# Patient Record
Sex: FEMALE | Race: WHITE | Employment: OTHER | URBAN - METROPOLITAN AREA
[De-identification: names, ages, dates, MRNs, and addresses within clinical notes are randomized per-mention and may not be internally consistent; named-entity substitution may affect disease eponyms.]

---

## 2017-02-20 DIAGNOSIS — F34.1 DYSTHYMIA: ICD-10-CM

## 2017-02-21 RX ORDER — FLUOXETINE 10 MG/1
CAPSULE ORAL
Qty: 30 CAP | Refills: 4 | Status: SHIPPED | OUTPATIENT
Start: 2017-02-21 | End: 2018-01-03 | Stop reason: SDUPTHER

## 2017-02-21 RX ORDER — ALENDRONATE SODIUM 70 MG/1
TABLET ORAL
Qty: 12 TAB | Refills: 1 | Status: SHIPPED | OUTPATIENT
Start: 2017-02-21 | End: 2017-10-02 | Stop reason: SDUPTHER

## 2017-10-03 RX ORDER — ALENDRONATE SODIUM 70 MG/1
TABLET ORAL
Qty: 12 TAB | Refills: 0 | Status: SHIPPED | OUTPATIENT
Start: 2017-10-03 | End: 2018-01-03 | Stop reason: SDUPTHER

## 2017-10-03 RX ORDER — ALENDRONATE SODIUM 70 MG/1
TABLET ORAL
Qty: 12 TAB | Refills: 0 | Status: SHIPPED | OUTPATIENT
Start: 2017-10-03 | End: 2018-04-18 | Stop reason: ALTCHOICE

## 2018-01-03 ENCOUNTER — OFFICE VISIT (OUTPATIENT)
Dept: INTERNAL MEDICINE CLINIC | Age: 68
End: 2018-01-03

## 2018-01-03 VITALS
DIASTOLIC BLOOD PRESSURE: 59 MMHG | OXYGEN SATURATION: 97 % | WEIGHT: 176 LBS | HEIGHT: 65 IN | RESPIRATION RATE: 16 BRPM | HEART RATE: 58 BPM | BODY MASS INDEX: 29.32 KG/M2 | TEMPERATURE: 97.5 F | SYSTOLIC BLOOD PRESSURE: 114 MMHG

## 2018-01-03 DIAGNOSIS — Z12.39 BREAST CANCER SCREENING: ICD-10-CM

## 2018-01-03 DIAGNOSIS — M81.6 LOCALIZED OSTEOPOROSIS WITHOUT CURRENT PATHOLOGICAL FRACTURE: ICD-10-CM

## 2018-01-03 DIAGNOSIS — F34.1 DYSTHYMIA: ICD-10-CM

## 2018-01-03 DIAGNOSIS — M47.816 SPONDYLOSIS OF LUMBAR REGION WITHOUT MYELOPATHY OR RADICULOPATHY: Primary | ICD-10-CM

## 2018-01-03 RX ORDER — FLUOXETINE 10 MG/1
CAPSULE ORAL
Qty: 30 CAP | Refills: 5 | Status: SHIPPED | OUTPATIENT
Start: 2018-01-03 | End: 2018-12-05 | Stop reason: SDUPTHER

## 2018-01-03 RX ORDER — SULINDAC 200 MG/1
200 TABLET ORAL
Qty: 30 TAB | Refills: 2 | Status: SHIPPED | OUTPATIENT
Start: 2018-01-03 | End: 2018-04-18 | Stop reason: SDUPTHER

## 2018-01-03 NOTE — MR AVS SNAPSHOT
Visit Information Date & Time Provider Department Dept. Phone Encounter #  
 1/3/2018  3:00 PM Madelon Cabot, MD [facility] [address] [id number] Upcoming Health Maintenance Date Due Hepatitis C Screening 1950 GLAUCOMA SCREENING Q2Y 8/25/2015 Pneumococcal 65+ Low/Medium Risk (2 of 2 - PCV13) 12/21/2016 BREAST CANCER SCRN MAMMOGRAM 2/23/2017 MEDICARE YEARLY EXAM 4/12/2017 Influenza Age 5 to Adult 8/1/2017 DTaP/Tdap/Td series (2 - Td) 9/8/2019 COLONOSCOPY 11/29/2021 Allergies as of 1/3/2018  Review Complete On: 1/3/2018 By: Madelon Cabot, MD  
 No Known Allergies Current Immunizations  Reviewed on 1/3/2018 Name Date Pneumococcal Polysaccharide (PPSV-23) 12/21/2015 TDAP Vaccine 9/8/2009  3:50 PM  
 Zoster Vaccine, Live 8/5/2013 Reviewed by Madelon Cabot, MD on 1/3/2018 at  3:51 PM  
You Were Diagnosed With   
  
 Codes Comments Spondylosis of lumbar region without myelopathy or radiculopathy    -  Primary ICD-10-CM: M47.816 ICD-9-CM: 721.3 Dysthymia     ICD-10-CM: F34.1 ICD-9-CM: 300.4 Localized osteoporosis without current pathological fracture     ICD-10-CM: M81.6 ICD-9-CM: 733.09 Breast cancer screening     ICD-10-CM: Z12.31 
ICD-9-CM: V76.10 Vitals BP Pulse Temp Resp Height(growth percentile) Weight(growth percentile) 114/59 (BP 1 Location: Left arm, BP Patient Position: Sitting) (!) 58 97.5 °F (36.4 °C) (Oral) 16 5' 5\" (1.651 m) 176 lb (79.8 kg) LMP SpO2 BMI OB Status Smoking Status 04/23/2003 97% 29.29 kg/m2 Postmenopausal Never Smoker Vitals History BMI and BSA Data Body Mass Index Body Surface Area  
 29.29 kg/m 2 1.91 m 2 Preferred Pharmacy Pharmacy Name Phone [address] [place], [place] - [phone] [place] [phone] Your Updated Medication List  
  
   
 This list is accurate as of: 1/3/18  3:53 PM.  Always use your most recent med list.  
  
  
  
  
 alendronate 70 mg tablet Commonly known as:  FOSAMAX  
take 1 tablet by mouth every Sunday for 90 days FLUoxetine 10 mg capsule Commonly known as:  PROzac Take 1 cap by mouth every fourty-eight (48) hours for 90 doses. sulindac 200 mg tablet Commonly known as:  CLINORIL Take 1 Tab by mouth daily as needed. appt needed for further refills VITAMIN D3 1,000 unit Cap Generic drug:  cholecalciferol Take 2,000 Units by mouth. Prescriptions Sent to Pharmacy Refills  
 sulindac (CLINORIL) 200 mg tablet 2 Sig: Take 1 Tab by mouth daily as needed. appt needed for further refills Class: Normal  
 Pharmacy: [address] [place] [address] Ph #: [phone] Route: Oral  
 FLUoxetine (PROZAC) 10 mg capsule 5 Sig: Take 1 cap by mouth every fourty-eight (48) hours for 90 doses. Class: Normal  
 Pharmacy: [address] [place] [address] Ph #: [phone] To-Do List   
 01/03/2018 Imaging:  DEXA BONE DENSITY STUDY AXIAL   
  
 01/03/2018 Imaging:  GEOVANY 3D JENNIFER W MAMMO BI SCREENING INCL CAD Referral Information Referral ID Referred By Referred To  
  
 [id number] Mercedes GUERRIER Not Available Visits Status Start Date End Date 1 New Request 1/3/18 1/3/19 If your referral has a status of pending review or denied, additional information will be sent to support the outcome of this decision. Introducing [facility] & HEALTH SERVICES! Dear Amelia Ruiz: 
Thank you for requesting a Overblog account. Our records indicate that you already have an active Overblog account. You can access your account anytime at [email or web address]. [email or web address] Technology Did you know that you can access your hospital and ER discharge instructions at any time in Overblog?   You can also review all of your test results from your hospital stay or ER visit. Additional Information If you have questions, please visit the Frequently Asked Questions section of the EventSorbet website at [email or web address] Remember, EventSorbet is NOT to be used for urgent needs. For medical emergencies, dial 911. Now available from your iPhone and Android! Please provide this summary of care documentation to your next provider. Your primary care clinician is listed as Charla Whitley. If you have any questions after today's visit, please call [phone].

## 2018-01-12 ENCOUNTER — HOSPITAL ENCOUNTER (OUTPATIENT)
Dept: MAMMOGRAPHY | Age: 68
Discharge: HOME | End: 2018-01-12
Attending: INTERNAL MEDICINE
Payer: MEDICARE

## 2018-01-12 DIAGNOSIS — M81.6 LOCALIZED OSTEOPOROSIS WITHOUT CURRENT PATHOLOGICAL FRACTURE: ICD-10-CM

## 2018-01-12 DIAGNOSIS — Z12.39 BREAST CANCER SCREENING: ICD-10-CM

## 2018-01-12 PROCEDURE — 77063 BREAST TOMOSYNTHESIS BI: CPT

## 2018-01-12 PROCEDURE — 77080 DXA BONE DENSITY AXIAL: CPT

## 2018-01-15 ENCOUNTER — PATIENT MESSAGE (OUTPATIENT)
Dept: INTERNAL MEDICINE CLINIC | Age: 68
End: 2018-01-15

## 2018-01-15 ENCOUNTER — TELEPHONE (OUTPATIENT)
Dept: INTERNAL MEDICINE CLINIC | Age: 68
End: 2018-01-15

## 2018-01-15 NOTE — TELEPHONE ENCOUNTER
----- Message from Rowan Newman sent at 1/15/2018  4:10 PM EST -----  Regarding: Dr. Delisa Rosario  Pt is requesting a call, in regards to a CPE. Pt is stating that 9:00 AM is too late for her and she'd like to be seen earlier for blood work. Best contact number [phone].

## 2018-01-16 NOTE — TELEPHONE ENCOUNTER
Scheduled patient for April 18th at 7:30 for her medicare wellness. Patient aware & voiced understanding.

## 2018-01-17 NOTE — TELEPHONE ENCOUNTER
From: Negrito Richards MD  To: Calin Hair  Sent: 1/15/2018 3:38 PM EST  Subject: dexa    Shows stable to Improved osteopenia-keep up the good work!   Odlays Padron

## 2018-04-18 ENCOUNTER — OFFICE VISIT (OUTPATIENT)
Dept: INTERNAL MEDICINE CLINIC | Age: 68
End: 2018-04-18

## 2018-04-18 ENCOUNTER — HOSPITAL ENCOUNTER (OUTPATIENT)
Dept: LAB | Age: 68
Discharge: HOME | End: 2018-04-18
Payer: MEDICARE

## 2018-04-18 VITALS
OXYGEN SATURATION: 99 % | TEMPERATURE: 98 F | HEART RATE: 61 BPM | BODY MASS INDEX: 27.99 KG/M2 | WEIGHT: 168 LBS | HEIGHT: 65 IN | DIASTOLIC BLOOD PRESSURE: 54 MMHG | SYSTOLIC BLOOD PRESSURE: 107 MMHG | RESPIRATION RATE: 15 BRPM

## 2018-04-18 DIAGNOSIS — F34.1 DYSTHYMIA: ICD-10-CM

## 2018-04-18 DIAGNOSIS — K51.90 ULCERATIVE COLITIS WITHOUT COMPLICATIONS, UNSPECIFIED LOCATION (HCC): ICD-10-CM

## 2018-04-18 DIAGNOSIS — M47.896 OTHER OSTEOARTHRITIS OF SPINE, LUMBAR REGION: ICD-10-CM

## 2018-04-18 DIAGNOSIS — M47.816 SPONDYLOSIS OF LUMBAR REGION WITHOUT MYELOPATHY OR RADICULOPATHY: ICD-10-CM

## 2018-04-18 DIAGNOSIS — Z00.00 MEDICARE ANNUAL WELLNESS VISIT, SUBSEQUENT: Primary | ICD-10-CM

## 2018-04-18 PROCEDURE — 81001 URINALYSIS AUTO W/SCOPE: CPT

## 2018-04-18 PROCEDURE — 84443 ASSAY THYROID STIM HORMONE: CPT

## 2018-04-18 PROCEDURE — 85025 COMPLETE CBC W/AUTO DIFF WBC: CPT

## 2018-04-18 PROCEDURE — 82306 VITAMIN D 25 HYDROXY: CPT

## 2018-04-18 PROCEDURE — 80053 COMPREHEN METABOLIC PANEL: CPT

## 2018-04-18 PROCEDURE — 80061 LIPID PANEL: CPT

## 2018-04-18 PROCEDURE — 36415 COLL VENOUS BLD VENIPUNCTURE: CPT

## 2018-04-18 RX ORDER — 1.1% SODIUM FLUORIDE PRESCRIPTION DENTAL CREAM 5 MG/G
CREAM DENTAL AS DIRECTED
COMMUNITY
Start: 2018-03-23

## 2018-04-18 RX ORDER — METRONIDAZOLE 7.5 MG/G
CREAM TOPICAL AS DIRECTED
COMMUNITY
Start: 2018-03-22

## 2018-04-18 RX ORDER — SULINDAC 200 MG/1
200 TABLET ORAL
Qty: 30 TAB | Refills: 5 | Status: SHIPPED | OUTPATIENT
Start: 2018-04-18 | End: 2019-01-22 | Stop reason: SDUPTHER

## 2018-04-18 NOTE — PATIENT INSTRUCTIONS

## 2018-04-18 NOTE — MR AVS SNAPSHOT
[facility] 
 
 
 [address] [place] [address] [address] 
[phone] Patient: Linda Agiular MRN: [id number] FWC:[id number] Visit Information Date & Time Provider Department Dept. Phone Encounter #  
 4/18/2018  7:30 AM Karl Caballero MD [facility] [address]  [id number] Upcoming Health Maintenance Date Due Hepatitis C Screening 1950 GLAUCOMA SCREENING Q2Y 8/25/2015 Pneumococcal 65+ Low/Medium Risk (2 of 2 - PCV13) 12/21/2016 MEDICARE YEARLY EXAM 3/14/2018 DTaP/Tdap/Td series (2 - Td) 9/8/2019 BREAST CANCER SCRN MAMMOGRAM 1/12/2020 COLONOSCOPY 11/29/2021 Allergies as of 4/18/2018  Review Complete On: 4/18/2018 By: Karl Caballero MD  
 No Known Allergies Current Immunizations  Reviewed on 4/18/2018 Name Date Influenza High Dose Vaccine PF 1/3/2018 Pneumococcal Polysaccharide (PPSV-23) 12/21/2015 TDAP Vaccine 9/8/2009  3:50 PM  
 Zoster Vaccine, Live 8/5/2013 Reviewed by Karl Caballero MD on 4/18/2018 at  7:37 AM  
You Were Diagnosed With   
  
 Codes Comments Medicare annual wellness visit, subsequent    -  Primary ICD-10-CM: Z00.00 ICD-9-CM: V70.0 Other osteoarthritis of spine, lumbar region     ICD-10-CM: M47.896 ICD-9-CM: 721.3 Dysthymia     ICD-10-CM: F34.1 ICD-9-CM: 300.4 Ulcerative colitis without complications, unspecified location [facility])     ICD-10-CM: K51.90 ICD-9-CM: 039. 9 Vitals BP Pulse Temp Resp Height(growth percentile) Weight(growth percentile) 107/54 (BP 1 Location: Left arm, BP Patient Position: Sitting) 61 98 °F (36.7 °C) (Oral) 15 5' 5\" (1.651 m) 168 lb (76.2 kg) LMP SpO2 BMI OB Status Smoking Status 04/23/2003 99% 27.96 kg/m2 Postmenopausal Never Smoker Vitals History BMI and BSA Data Body Mass Index Body Surface Area  
 27.96 kg/m 2 1.87 m 2 Preferred Pharmacy Pharmacy Name Phone [facility] [address] [facility] [address] [facility] [phone] [facility] [phone] Your Updated Medication List  
  
   
This list is accurate as of 4/18/18  7:54 AM.  Always use your most recent med list.  
  
  
  
  
 FLUoxetine 10 mg capsule Commonly known as:  PROzac Take 1 cap by mouth every fourty-eight (48) hours for 90 doses. metroNIDAZOLE 0.75 % topical cream  
Commonly known as:  METROCREAM  
as directed. SF 5000 PLUS 1.1 % Crea Generic drug:  fluoride (sodium)  
as directed. sulindac 200 mg tablet Commonly known as:  CLINORIL Take 1 Tab by mouth daily as needed. appt needed for further refills VITAMIN D3 1,000 unit Cap Generic drug:  cholecalciferol Take 2,000 Units by mouth. We Performed the Following CBC WITH AUTOMATED DIFF [30206 CPT(R)] LIPID PANEL [99027 CPT(R)] METABOLIC PANEL, COMPREHENSIVE [87033 CPT(R)] REFERRAL TO GASTROENTEROLOGY [HXP66 Custom] TSH 3RD GENERATION [20531 CPT(R)] URINALYSIS W/ RFLX MICROSCOPIC [34735 CPT(R)] VITAMIN D, 25 HYDROXY [id number] CPT(R)] Referral Information Referral ID Referred By Referred To  
  
 [id number] [facility] [address] [place] [address] Phone: [phone] Fax: [phone] Visits Status Start Date End Date 1 New Request 4/18/18 4/18/19 If your referral has a status of pending review or denied, additional information will be sent to support the outcome of this decision. Introducing [facility] & HEALTH SERVICES! Dear Kenn Gabriel: 
Thank you for requesting a Payvment account. Our records indicate that you already have an active Payvment account. You can access your account anytime at [email or web address] Water/Cooper's Classics Did you know that you can access your hospital and ER discharge instructions at any time in Payvment?   You can also review all of your test results from your hospital stay or ER visit. Additional Information If you have questions, please visit the Frequently Asked Questions section of the Featherlight website at [email or web address] Remember, Featherlight is NOT to be used for urgent needs. For medical emergencies, dial 911. Now available from your iPhone and Android! Please provide this summary of care documentation to your next provider. Your primary care clinician is listed as Charla Whitley. If you have any questions after today's visit, please call [phone].

## 2018-04-18 NOTE — PROGRESS NOTES
This is the Subsequent Medicare Annual Wellness Exam, performed 12 months or more after the Initial AWV or the last Subsequent AWV    I have reviewed the patient's medical history in detail and updated the computerized patient record. History     Past Medical History:   Diagnosis Date    DJD (degenerative joint disease), lumbar 6/9/2009    Dysthymia 6/9/2009    Ulcerative colitis ([facility] [address] 6/9/2009      Past Surgical History:   Procedure Laterality Date    ENDOSCOPY, COLON, DIAGNOSTIC  11/29/11    dr Andre Devlin normal     Current Outpatient Prescriptions   Medication Sig Dispense Refill    metroNIDAZOLE (METROCREAM) 0.75 % topical cream as directed.  SF 5000 PLUS 1.1 % crea as directed.  sulindac (CLINORIL) 200 mg tablet Take 1 Tab by mouth daily as needed. appt needed for further refills 30 Tab 2    FLUoxetine (PROZAC) 10 mg capsule Take 1 cap by mouth every fourty-eight (48) hours for 90 doses. 30 Cap 5    Cholecalciferol, Vitamin D3, (VITAMIN D3) 1,000 unit cap Take 2,000 Units by mouth. No Known Allergies  Family History   Problem Relation Age of Onset    Hypertension Sister      Social History   Substance Use Topics    Smoking status: Never Smoker    Smokeless tobacco: Never Used    Alcohol use 3.0 oz/week     6 Glasses of wine per week     Patient Active Problem List   Diagnosis Code    DJD (degenerative joint disease), lumbar M47.816    Dysthymia F34.1    Ulcerative colitis ([facility] [address] K51.90    Menopause Z78.0       Depression Risk Factor Screening:     PHQ over the last two weeks 4/18/2018   Little interest or pleasure in doing things Not at all   Feeling down, depressed or hopeless Not at all   Total Score PHQ 2 0     Alcohol Risk Factor Screening: You do not drink alcohol or very rarely. Functional Ability and Level of Safety:   Hearing Loss  The patient wears hearing aids. Activities of Daily Living  The home contains: no safety equipment.   Patient does total self care    Fall Risk  Fall Risk Assessment, last 12 mths 4/18/2018   Able to walk? Yes   Fall in past 12 months? No   Fall with injury? No       Abuse Screen  Patient is not abused    Cognitive Screening   Evaluation of Cognitive Function:  Has your family/caregiver stated any concerns about your memory: no  Normal    Patient Care Team   Patient Care Team:  Hubert Peres MD as PCP - General    Assessment/Plan   Education and counseling provided:  Are appropriate based on today's review and evaluation  Pneumococcal Vaccine  Influenza Vaccine  Screening Mammography  Colorectal cancer screening tests  Bone mass measurement (DEXA)  Screening for glaucoma    Diagnoses and all orders for this visit:    1. Medicare annual wellness visit, subsequent  -     URINALYSIS W/ RFLX MICROSCOPIC    2. Other osteoarthritis of spine, lumbar region-gardening and uses prn nsaid-     LIPID PANEL  -     METABOLIC PANEL, COMPREHENSIVE    3. Dysthymia-cont prozac 10 qod  -     TSH 3RD GENERATION    4.  Ulcerative colitis without complications, unspecified location (HCC)-no sxs now but last study done in 2011 advised repeat  -     Sharon Lunch [facility]  -     CBC WITH AUTOMATED DIFF  -     VITAMIN D, [address] Maintenance Due   Topic Date Due    Hepatitis C Screening  1950    GLAUCOMA SCREENING Q2Y  08/25/2015    Pneumococcal 65+ Low/Medium Risk (2 of 2 - PCV13) 12/21/2016    MEDICARE YEARLY EXAM  03/14/2018

## 2018-04-18 NOTE — PROGRESS NOTES
HISTORY OF PRESENT ILLNESS  Nasreen Landa is a 79 y.o. female. HPI  Doing well. Issues:  1. Arthritis. Uses Clinoril prn after she's been gardening. She's had no GI upset with it or evidence of bleeding. 2. Dysthymia, managed with very low dose Fluoxetine 10 mg every other day. She is working on a garden and feels optimistic and happy. 3. Bones. Bone density in January showed osteopenia. She's had 3-4 years of Fosamax. Will take a drug holiday, stay off Fosamax, continue vitamin D, weightbearing exercise and recheck study in two years. Review of Systems   Constitutional: Negative for chills, fever and weight loss. Respiratory: Negative for cough, shortness of breath and wheezing. Cardiovascular: Negative for chest pain, palpitations, orthopnea, leg swelling and PND. Gastrointestinal: Negative for abdominal pain, diarrhea, heartburn and nausea. Musculoskeletal: Positive for joint pain. Negative for myalgias. Neurological: Negative for dizziness and headaches. Physical Exam   Constitutional: She is oriented to person, place, and time. She appears well-developed and well-nourished. HENT:   Head: Normocephalic and atraumatic. Eyes: Pupils are equal, round, and reactive to light. Neck: Normal range of motion. Neck supple. Carotid bruit is not present. No thyromegaly present. Cardiovascular: Normal rate, regular rhythm, S1 normal, S2 normal, normal heart sounds and intact distal pulses. No murmur heard. Pulmonary/Chest: Effort normal and breath sounds normal. No respiratory distress. She has no wheezes. She has no rales. Breast exam bilaterally without masses axillary nodes or discharge. BSE reviewed      Abdominal: Soft. Bowel sounds are normal. She exhibits no distension and no mass. There is no tenderness. Musculoskeletal: She exhibits no edema. Neurological: She is alert and oriented to person, place, and time. Skin: No rash noted.    Psychiatric: She has a normal mood and affect. Her behavior is normal.   Nursing note and vitals reviewed. ASSESSMENT and PLAN  Diagnoses and all orders for this visit:    1. Medicare annual wellness visit, subsequent  -     URINALYSIS W/ RFLX MICROSCOPIC    2. Other osteoarthritis of spine, lumbar region  -     LIPID PANEL  -     METABOLIC PANEL, COMPREHENSIVE    3. Dysthymia  -     TSH 3RD GENERATION    4. Ulcer[facility] without complications[facility]  -     Madhuri Kirkland [facility]  -     CBC WITH AUTOMATED DIFF  -     VITAMIN D, 25 HYDROXY    5. Spondylosis of lumbar region without myelopathy or radiculopathy  -     sulindac (CLINORIL) 200 mg tablet; Take 1 Tab by mouth daily as needed.

## 2018-04-19 LAB
25(OH)D3+25(OH)D2 SERPL-MCNC: 42.6 NG/ML (ref 30–100)
ALBUMIN SERPL-MCNC: 4 G/DL (ref 3.6–4.8)
ALBUMIN/GLOB SERPL: 1.5 {RATIO} (ref 1.2–2.2)
ALP SERPL-CCNC: 54 IU/L (ref 39–117)
ALT SERPL-CCNC: 9 IU/L (ref 0–32)
APPEARANCE UR: CLEAR
AST SERPL-CCNC: 16 IU/L (ref 0–40)
BACTERIA #/AREA URNS HPF: ABNORMAL /[HPF]
BASOPHILS # BLD AUTO: 0 X10E3/UL (ref 0–0.2)
BASOPHILS NFR BLD AUTO: 0 %
BILIRUB SERPL-MCNC: 0.3 MG/DL (ref 0–1.2)
BILIRUB UR QL STRIP: NEGATIVE
BUN SERPL-MCNC: 12 MG/DL (ref 8–27)
BUN/CREAT SERPL: 18 (ref 12–28)
CALCIUM SERPL-MCNC: 9.4 MG/DL (ref 8.7–10.3)
CASTS URNS QL MICRO: ABNORMAL /LPF
CHLORIDE SERPL-SCNC: 100 MMOL/L (ref 96–106)
CHOLEST SERPL-MCNC: 171 MG/DL (ref 100–199)
CO2 SERPL-SCNC: 27 MMOL/L (ref 18–29)
COLOR UR: YELLOW
CREAT SERPL-MCNC: 0.66 MG/DL (ref 0.57–1)
EOSINOPHIL # BLD AUTO: 0.1 X10E3/UL (ref 0–0.4)
EOSINOPHIL NFR BLD AUTO: 2 %
EPI CELLS #/AREA URNS HPF: ABNORMAL /HPF
ERYTHROCYTE [DISTWIDTH] IN BLOOD BY AUTOMATED COUNT: 13.3 % (ref 12.3–15.4)
GFR SERPLBLD CREATININE-BSD FMLA CKD-EPI: 106 ML/MIN/1.73
GFR SERPLBLD CREATININE-BSD FMLA CKD-EPI: 92 ML/MIN/1.73
GLOBULIN SER CALC-MCNC: 2.6 G/DL (ref 1.5–4.5)
GLUCOSE SERPL-MCNC: 92 MG/DL (ref 65–99)
GLUCOSE UR QL: NEGATIVE
HCT VFR BLD AUTO: 39.5 % (ref 34–46.6)
HDLC SERPL-MCNC: 56 MG/DL
HGB BLD-MCNC: 13.2 G/DL (ref 11.1–15.9)
HGB UR QL STRIP: NEGATIVE
IMM GRANULOCYTES # BLD: 0 X10E3/UL (ref 0–0.1)
IMM GRANULOCYTES NFR BLD: 0 %
INTERPRETATION, 910389: NORMAL
KETONES UR QL STRIP: NEGATIVE
LDLC SERPL CALC-MCNC: 106 MG/DL (ref 0–99)
LEUKOCYTE ESTERASE UR QL STRIP: ABNORMAL
LYMPHOCYTES # BLD AUTO: 1.1 X10E3/UL (ref 0.7–3.1)
LYMPHOCYTES NFR BLD AUTO: 23 %
MCH RBC QN AUTO: 30.9 PG (ref 26.6–33)
MCHC RBC AUTO-ENTMCNC: 33.4 G/DL (ref 31.5–35.7)
MCV RBC AUTO: 93 FL (ref 79–97)
MICRO URNS: ABNORMAL
MONOCYTES # BLD AUTO: 0.5 X10E3/UL (ref 0.1–0.9)
MONOCYTES NFR BLD AUTO: 10 %
NEUTROPHILS # BLD AUTO: 3.2 X10E3/UL (ref 1.4–7)
NEUTROPHILS NFR BLD AUTO: 65 %
NITRITE UR QL STRIP: NEGATIVE
PH UR STRIP: 8.5 [PH] (ref 5–7.5)
PLATELET # BLD AUTO: 311 X10E3/UL (ref 150–379)
POTASSIUM SERPL-SCNC: 4.8 MMOL/L (ref 3.5–5.2)
PROT SERPL-MCNC: 6.6 G/DL (ref 6–8.5)
PROT UR QL STRIP: ABNORMAL
RBC # BLD AUTO: 4.27 X10E6/UL (ref 3.77–5.28)
RBC #/AREA URNS HPF: ABNORMAL /HPF
SODIUM SERPL-SCNC: 141 MMOL/L (ref 134–144)
SP GR UR: 1.02 (ref 1–1.03)
TRIGL SERPL-MCNC: 44 MG/DL (ref 0–149)
TSH SERPL DL<=0.005 MIU/L-ACNC: 2.27 UIU/ML (ref 0.45–4.5)
UROBILINOGEN UR STRIP-MCNC: 0.2 MG/DL (ref 0.2–1)
VLDLC SERPL CALC-MCNC: 9 MG/DL (ref 5–40)
WBC # BLD AUTO: 5 X10E3/UL (ref 3.4–10.8)
WBC #/AREA URNS HPF: ABNORMAL /HPF

## 2018-08-05 NOTE — H&P
Gastroenterology Outpatient History and Physical    Patient: Mony Browne    Physician: Ileana Mina MD    Vital Signs:  See nursing notes    Allergies: No Known Allergies    Chief Complaint:  Personal history of colon polyps    History of Present Illness:  2011 tubular had normal removed from colon. No symptoms; no chest pain, SOB, edema, focal weakness, numbness    Justification for Procedure:   age    History:  Past Medical History:   Diagnosis Date    DJD (degenerative joint disease), lumbar 6/9/2009    Dysthymia 6/9/2009    Ulcerative colitis ([address] 6/9/2009      Past Surgical History:   Procedure Laterality Date    ENDOSCOPY, COLON, DIAGNOSTIC  11/29/11    dr Jose Rutledge normal      Social History     Social History    Marital status:      Spouse name: N/A    Number of children: N/A    Years of education: N/A     Social History Main Topics    Smoking status: Never Smoker    Smokeless tobacco: Never Used    Alcohol use 3.0 oz/week     6 Glasses of wine per week    Drug use: No    Sexual activity: Not Currently     Other Topics Concern    Not on file     Social History Narrative      Family History   Problem Relation Age of Onset    Hypertension Sister        Medications:   Prior to Admission medications    Medication Sig Start Date End Date Taking? Authorizing Provider   metroNIDAZOLE (METROCREAM) 0.75 % topical cream as directed. 3/22/18   Historical Provider   SF 5000 PLUS 1.1 % crea as directed. 3/23/18   Historical Provider   sulindac (CLINORIL) 200 mg tablet Take 1 Tab by mouth daily as needed. 4/18/18   Chaim Bland MD   FLUoxetine (PROZAC) 10 mg capsule Take 1 cap by mouth every fourty-eight (48) hours for 90 doses. 1/3/18   Chaim Bland MD   Cholecalciferol, Vitamin D3, (VITAMIN D3) 1,000 unit cap Take 2,000 Units by mouth. Historical Provider       Physical Exam:   General: alert, cooperative, no distress   HEENT: Head: Normal, normocephalic, atraumatic.    Heart: regular rate and rhythm   Lungs: chest clear, no wheezing, rales, normal symmetric air entry   Abdominal: Bowel sounds are normal, liver is not enlarged, spleen is not enlarged           Findings/Diagnosis:  Personal history of colon polyps    Plan of Care/Planned Procedure: I've discussed colonoscopy possible biopsy, polypectomy, cautery, injection, alternatives, complications including but not limited to pain, cardiopulmonary event, bleeding, perforation requiring additional blood transfusion or operative repair; all questions answered.     Signed By: Jakob Root MD     August 13, 2018

## 2018-08-08 NOTE — PERIOP NOTES
[address]  Endoscopy Preprocedure Instructions      1. On the day of your surgery, please report to registration located on the 2nd floor of the  [facility]. yes    2. You must have a responsible adult to drive you to the hospital, stay at the hospital during your procedure and drive you home. If they leave your procedure will not be started (no exceptions). yes    3. Do not have anything to eat or drink (including water, gum, mints, coffee, and juice) after midnight. This does not apply to the medications you were instructed to take by your physician. yesIf you are currently taking Plavix, Coumadin, Aspirin, or other blood-thinning agents, contact your physician for special instructions. not applicable,    4. If you are having a procedure that requires bowel prep: We recommend that you have only clear liquids the day before your procedure and begin your bowel prep by 5:00 pm.  You may continue to drink clear liquids until midnight. If for any reason you are not able to complete your prep please notify your physician immediately. yes    5. Have a list of all current medications, including vitamins, herbal supplements and any other over the counter medications. yes    6. If you wear glasses, contacts, dentures and/or hearing aids, they may be removed prior to procedure, please bring a case to store them in. yes    7. You should understand that if you do not follow these instructions your procedure may be cancelled. If your physical condition changes (I.e. fever, cold or flu) please contact your doctor as soon as possible. 8. It is important that you be on time.   If for any reason you are unable to keep your appointment please call [phone] the day of or your physicians office prior to your scheduled procedure

## 2018-08-13 ENCOUNTER — ANESTHESIA EVENT (OUTPATIENT)
Dept: ENDOSCOPY | Age: 68
End: 2018-08-13
Payer: MEDICARE

## 2018-08-13 ENCOUNTER — HOSPITAL ENCOUNTER (OUTPATIENT)
Age: 68
Discharge: HOME | End: 2018-08-13
Attending: INTERNAL MEDICINE | Admitting: INTERNAL MEDICINE
Payer: MEDICARE

## 2018-08-13 ENCOUNTER — ANESTHESIA (OUTPATIENT)
Dept: ENDOSCOPY | Age: 68
End: 2018-08-13
Payer: MEDICARE

## 2018-08-13 VITALS
OXYGEN SATURATION: 98 % | RESPIRATION RATE: 13 BRPM | SYSTOLIC BLOOD PRESSURE: 118 MMHG | HEART RATE: 60 BPM | BODY MASS INDEX: 27.32 KG/M2 | DIASTOLIC BLOOD PRESSURE: 59 MMHG | WEIGHT: 164 LBS | TEMPERATURE: 97.9 F | HEIGHT: 65 IN

## 2018-08-13 PROCEDURE — 77030013992 HC SNR POLYP ENDOSC BSC -B: Performed by: INTERNAL MEDICINE

## 2018-08-13 PROCEDURE — 74011250636 HC RX REV CODE- 250/636

## 2018-08-13 PROCEDURE — 76040000019: Performed by: INTERNAL MEDICINE

## 2018-08-13 PROCEDURE — 88305 TISSUE EXAM BY PATHOLOGIST: CPT | Performed by: INTERNAL MEDICINE

## 2018-08-13 PROCEDURE — 76060000031 HC ANESTHESIA FIRST 0.5 HR: Performed by: INTERNAL MEDICINE

## 2018-08-13 RX ORDER — EPINEPHRINE 0.1 MG/ML
1 INJECTION INTRACARDIAC; INTRAVENOUS
Status: DISCONTINUED | OUTPATIENT
Start: 2018-08-13 | End: 2018-08-13 | Stop reason: HOSPADM

## 2018-08-13 RX ORDER — PROPOFOL 10 MG/ML
INJECTION, EMULSION INTRAVENOUS
Status: DISCONTINUED | OUTPATIENT
Start: 2018-08-13 | End: 2018-08-13 | Stop reason: HOSPADM

## 2018-08-13 RX ORDER — FLUMAZENIL 0.1 MG/ML
0.2 INJECTION INTRAVENOUS
Status: DISCONTINUED | OUTPATIENT
Start: 2018-08-13 | End: 2018-08-13 | Stop reason: HOSPADM

## 2018-08-13 RX ORDER — PROPOFOL 10 MG/ML
INJECTION, EMULSION INTRAVENOUS AS NEEDED
Status: DISCONTINUED | OUTPATIENT
Start: 2018-08-13 | End: 2018-08-13 | Stop reason: HOSPADM

## 2018-08-13 RX ORDER — NALOXONE HYDROCHLORIDE 0.4 MG/ML
0.4 INJECTION, SOLUTION INTRAMUSCULAR; INTRAVENOUS; SUBCUTANEOUS
Status: DISCONTINUED | OUTPATIENT
Start: 2018-08-13 | End: 2018-08-13 | Stop reason: HOSPADM

## 2018-08-13 RX ORDER — DEXTROMETHORPHAN/PSEUDOEPHED 2.5-7.5/.8
1.2 DROPS ORAL
Status: DISCONTINUED | OUTPATIENT
Start: 2018-08-13 | End: 2018-08-13 | Stop reason: HOSPADM

## 2018-08-13 RX ORDER — MIDAZOLAM HYDROCHLORIDE 1 MG/ML
.25-5 INJECTION, SOLUTION INTRAMUSCULAR; INTRAVENOUS
Status: DISCONTINUED | OUTPATIENT
Start: 2018-08-13 | End: 2018-08-13 | Stop reason: HOSPADM

## 2018-08-13 RX ORDER — FENTANYL CITRATE 50 UG/ML
100 INJECTION, SOLUTION INTRAMUSCULAR; INTRAVENOUS
Status: DISCONTINUED | OUTPATIENT
Start: 2018-08-13 | End: 2018-08-13 | Stop reason: HOSPADM

## 2018-08-13 RX ORDER — ATROPINE SULFATE 0.1 MG/ML
0.5 INJECTION INTRAVENOUS
Status: DISCONTINUED | OUTPATIENT
Start: 2018-08-13 | End: 2018-08-13 | Stop reason: HOSPADM

## 2018-08-13 RX ORDER — SODIUM CHLORIDE 9 MG/ML
50 INJECTION, SOLUTION INTRAVENOUS CONTINUOUS
Status: DISCONTINUED | OUTPATIENT
Start: 2018-08-13 | End: 2018-08-13 | Stop reason: HOSPADM

## 2018-08-13 RX ADMIN — PROPOFOL 20 MG: 10 INJECTION, EMULSION INTRAVENOUS at 08:05

## 2018-08-13 RX ADMIN — PROPOFOL 10 MG: 10 INJECTION, EMULSION INTRAVENOUS at 08:20

## 2018-08-13 RX ADMIN — PROPOFOL 40 MG: 10 INJECTION, EMULSION INTRAVENOUS at 08:17

## 2018-08-13 RX ADMIN — PROPOFOL 100 MCG/KG/MIN: 10 INJECTION, EMULSION INTRAVENOUS at 08:01

## 2018-08-13 RX ADMIN — PROPOFOL 20 MG: 10 INJECTION, EMULSION INTRAVENOUS at 08:07

## 2018-08-13 RX ADMIN — PROPOFOL 10 MG: 10 INJECTION, EMULSION INTRAVENOUS at 08:23

## 2018-08-13 RX ADMIN — PROPOFOL 40 MG: 10 INJECTION, EMULSION INTRAVENOUS at 08:01

## 2018-08-13 RX ADMIN — PROPOFOL 20 MG: 10 INJECTION, EMULSION INTRAVENOUS at 08:03

## 2018-08-13 NOTE — PERIOP NOTES
Received report from Kim Le CRNA, see anesthesia records. ABD remains soft and non-tender post procedure. Pt has no complaints at this time and tolerated the procedure well. Endoscope was pre-cleaned at bedside immediately following procedure by Shalonda Perry.

## 2018-08-13 NOTE — DISCHARGE INSTRUCTIONS
Kait Kim  [id number]  1950    DISCHARGE INSTRUCTIONS  Discomfort:  Redness at IV site- apply warm compress to area; if redness or soreness persist- contact your physician. There may be a slight amount of blood passed from the rectum. Gaseous discomfort - walking, belching will help relieve any discomfort. You may not operate a vehicle for 12 hours. You may not engage in an occupation involving machinery or appliances for rest of today. You may not drink alcoholic beverages for at least 12 hours. Avoid making any critical decisions for at least 24 hours. DIET:   High fiber diet. - however -  remember your colon is empty and a heavy meal will produce gas. Avoid these foods:  vegetables, fried / greasy foods, carbonated drinks for today. ACTIVITY:  You may resume your normal daily activities it is recommended that you spend the remainder of the day resting -  avoid any strenuous activity. CALL M.D.   ANY SIGN OF:   Increasing pain, nausea, vomiting  Abdominal distension (swelling)  New increased bleeding (oral or rectal)  Fever (chills)  Pain in chest area  Bloody discharge from nose or mouth  Shortness of breath     Follow-up Instructions:  Call Dr. Germania Rasheed in five years for follow up exam      DISCHARGE SUMMARY from Nurse    The following personal items collected during your admission are returned to you:   Dental Appliance: Dental Appliances: None  Vision: Visual Aid: Glasses, With patient  Hearing Aid:    Jewelry:    Clothing:    Other Valuables:    Valuables sent to safe:

## 2018-08-13 NOTE — PROCEDURES
Marichuy Valente MD  [phone]      2018    Colonoscopy Procedure Note  Nahum Burger  :  1950  [facility] Medical Record Number: [id number]    Indications:     Personal history of colon polyps (screening only)  PCP:  Sylvia Shin MD  Anesthesia/Sedation: Conscious Sedation/Moderate Sedation  Endoscopist:  Dr. Jasmyn Rico  Complications:  None  Estimate Blood Loss:  None    Permit:  The indications, risks, benefits and alternatives were reviewed with the patient or their decision maker who was provided an opportunity to ask questions and all questions were answered. The specific risks of colonoscopy with conscious sedation were reviewed, including but not limited to anesthetic complication, bleeding, adverse drug reaction, missed lesion, infection, IV site reactions, and intestinal perforation which would lead to the need for surgical repair. Alternatives to colonoscopy including radiographic imaging, observation without testing, or laboratory testing were reviewed including the limitations of those alternatives. After considering the options and having all their questions answered, the patient or their decision maker provided both verbal and written consent to proceed. Procedure in Detail:  After obtaining informed consent, positioning of the patient in the left lateral decubitus position, and conduction of a pre-procedure pause or \"time out\" the endoscope was introduced into the anus and advanced to the cecum, which was identified by the ileocecal valve and appendiceal orifice. The quality of the colonic preparation was good. A careful inspection was made as the colonoscope was withdrawn, findings and interventions are described below. Appendiceal orifice photographed    Findings:    Three polyps all sessile and under 8 mm size    Specimens:    all polyps removed cold snare    Complications:   None; patient tolerated the procedure well. Estimated blood loss: none    Impression:  colon polyps    Recommendations:     - Repeat colonoscopy in 5 years. Thank you for entrusting me with this patient's care. Please do not hesitate to contact me with any questions or if I can be of assistance with any of your other patients' GI needs.     Signed By: Srinivasan Obando MD                        August 13, 2018

## 2018-08-13 NOTE — IP AVS SNAPSHOT
[address] 
[phone] Patient: Deidre Mckee MRN: [id number] UUS:8/10/9132 About your hospitalization You were admitted on:  August 13, 2018 You last received care in the:  [facility] ENDOSCOPY You were discharged on:  August 13, 2018 Why you were hospitalized Your primary diagnosis was:  Not on File Follow-up Information Follow up With Details Comments Contact Info Gerry Conte MD   [address] [place] [address] 
[phone] Discharge Orders None A check armond indicates which time of day the medication should be taken. My Medications CONTINUE taking these medications Instructions Each Dose to Equal  
 Morning Noon Evening Bedtime FLUoxetine 10 mg capsule Commonly known as:  PROzac Your last dose was: Your next dose is: Take 1 cap by mouth every fourty-eight (48) hours for 90 doses. metroNIDAZOLE 0.75 % topical cream  
Commonly known as:  METROCREAM  
   
Your last dose was: Your next dose is:    
   
   
 as directed. SF 5000 PLUS 1.1 % Crea Generic drug:  fluoride (sodium) Your last dose was: Your next dose is:    
   
   
 as directed. sulindac 200 mg tablet Commonly known as:  CLINORIL Your last dose was: Your next dose is: Take 1 Tab by mouth daily as needed. 200 mg  
    
   
   
   
  
 VITAMIN D3 1,000 unit Cap Generic drug:  cholecalciferol Your last dose was: Your next dose is: Take 2,000 Units by mouth. 2000 Units Discharge Instructions Deidre Mckee [id number] 
1950 DISCHARGE INSTRUCTIONS Discomfort: 
Redness at IV site- apply warm compress to area; if redness or soreness persist- contact your physician. There may be a slight amount of blood passed from the rectum. Gaseous discomfort - walking, belching will help relieve any discomfort. You may not operate a vehicle for 12 hours. You may not engage in an occupation involving machinery or appliances for rest of today. You may not drink alcoholic beverages for at least 12 hours. Avoid making any critical decisions for at least 24 hours. DIET: 
 High fiber diet.  however -  remember your colon is empty and a heavy meal will produce gas. Avoid these foods:  vegetables, fried / greasy foods, carbonated drinks for today. ACTIVITY: 
You may resume your normal daily activities it is recommended that you spend the remainder of the day resting -  avoid any strenuous activity. CALL M.D. ANY SIGN OF: Increasing pain, nausea, vomiting Abdominal distension (swelling) New increased bleeding (oral or rectal) Fever (chills) Pain in chest area Bloody discharge from nose or mouth Shortness of breath Follow-up Instructions: 
Call Dr. Giovanna Villavicencio in five years for follow up exam 
 
 
DISCHARGE SUMMARY from Nurse The following personal items collected during your admission are returned to you:  
Dental Appliance: Dental Appliances: None Vision: Visual Aid: Glasses, With patient Hearing Aid:   
Jewelry:   
Clothing:   
Other Valuables:   
Valuables sent to safe:   
 
 
ACO Transitions of Care Introducing [employer] Karyna Bains offers a voluntary care coordination program to provide high quality service and care to [facility] fee-for-service beneficiaries. Kassandra Longoria was designed to help you enhance your health and well-being through the following services: ? Transitions of Care  support for individuals who are transitioning from one care setting to another (example: Hospital to home). ?  Chronic and Complex Care Coordination  support for individuals and caregivers of those with serious or chronic illnesses or with more than one chronic (ongoing) condition and those who take a number of different medications. If you meet specific medical criteria, a [address] [place] [address] Rd may call you directly to coordinate your care with your primary care physician and your other care providers. For questions about the [facility] programs, please, contact your physicians office. For general questions or additional information about Accountable Care Organizations: 
Please visit www.medicare.gov/acos. html or call 1-800-MEDICARE (5-735.450.3909) TTY users should call 1-460.511.9082. Introducing [facility] & HEALTH SERVICES! Dear Army Burgess: 
Thank you for requesting a [employer] account. Our records indicate that you already have an active [employer] account. You can access your account anytime at [email or web address] [employer] Did you know that you can access your hospital and ER discharge instructions at any time in [employer]? You can also review all of your test results from your hospital stay or ER visit. Additional Information If you have questions, please visit the Frequently Asked Questions section of the [employer] website at [email or web address] [employer]/. Remember, [employer] is NOT to be used for urgent needs. For medical emergencies, dial 911. Now available from your iPhone and Android! Introducing Martín Feliciano As a [facility] patient, I wanted to make you aware of our electronic visit tool called Martín Feliciano. [facility] [address] 24/7 allows you to connect within minutes with a medical provider 24 hours a day, seven days a week via a mobile device or tablet or logging into a secure website from your computer. You can access Martín Feliciano from anywhere in the [place].  
 
A virtual visit might be right for you when you have a simple condition and feel like you just dont want to get out of bed, or cant get away from work for an appointment, when your regular [facility] provider is not available (evenings, weekends or holidays), or when youre out of town and need minor care. Electronic visits cost only $49 and if the [facility] 24/7 provider determines a prescription is needed to treat your condition, one can be electronically transmitted to a nearby pharmacy*. Please take a moment to enroll today if you have not already done so. The enrollment process is free and takes just a few minutes. To enroll, please download the [facility] Villalob[employer] 24/7 triston to your tablet or phone, or visit [email or web address] to enroll on your computer. And, as an [address] [place] [address] patient with a [employer] account, the results of your visits will be scanned into your electronic medical record and your primary care provider will be able to view the scanned results. We urge you to continue to see your regular [facility] provider for your ongoing medical care. And while your primary care provider may not be the one available when you seek a [employer] virtual visit, the peace of mind you get from getting a real diagnosis real time can be priceless. For more information on [employer], view our Frequently Asked Questions (FAQs) at [email or web address]. Sincerely, 
 
Jennifer Heredia MD 
Chief Medical Officer Jessa Bains *:  certain medications cannot be prescribed via [employer] Providers Seen During Your Hospitalization Provider Specialty Primary office phone Ihsan Reid MD Gastroenterology [phone] Your Primary Care Physician (PCP) Primary Care Physician Office Phone Office Fax Keara Rico [phone] You are allergic to the following No active allergies Recent Documentation Height Weight Breastfeeding? BMI OB Status Smoking Status 1.651 m 74.4 kg No 27.29 kg/m2 Postmenopausal Never Smoker Emergency Contacts Name Discharge Info Relation Home Work Mobile Chris Chao DISCHARGE CAREGIVER [3] Child [2] [phone] Patient Belongings The following personal items are in your possession at time of discharge: 
  Dental Appliances: None  Visual Aid: Glasses, With patient   Hearing Aids/Status: Bilateral, Functioning Please provide this summary of care documentation to your next provider. Signatures-by signing, you are acknowledging that this After Visit Summary has been reviewed with you and you have received a copy. Patient Signature:  ____________________________________________________________ Date:  ____________________________________________________________  
  
Aarti Artist Provider Signature:  ____________________________________________________________ Date:  ____________________________________________________________

## 2018-08-13 NOTE — PROGRESS NOTES
Hetal Demetrio  1950  [id number]    Situation:  Verbal report received from:   Venita Maldonado RN  Procedure: Procedure(s):  COLONOSCOPY  ENDOSCOPIC POLYPECTOMY    Background:    Preoperative diagnosis: PERSONAL HISTORY COLON POLYPS  Postoperative diagnosis: colon polyps    :  Dr. Maikol Gonzalez  Assistant(s): Endoscopy Technician-1: Honey Wright  Endoscopy RN-1: Alexa Isidro RN    Specimens:   ID Type Source Tests Collected by Time Destination   1 : colon polyps Preservative   Santana Clarke MD 8/13/2018 5693 Pathology     H. Pylori  no    Assessment:  Intra-procedure medications      Anesthesia gave intra-procedure sedation and medications, see anesthesia flow sheet yes    Intravenous fluids: NS@ KVO     Vital signs stable   yes    Abdominal assessment: round and soft   yes    Recommendation:  Discharge patient per MD order  yes.   Return to floor  outpatient  Family or Friend   friend  Permission to share finding with family or friend yes

## 2018-08-13 NOTE — ANESTHESIA POSTPROCEDURE EVALUATION
Post-Anesthesia Evaluation and Assessment    Patient: Zachary Willingham MRN: [id number]  SSN: [id number]    YOB: 1950  Age: 79 y.o. Sex: female       Cardiovascular Function/Vital Signs  Visit Vitals    /53    Pulse 63    Temp 36.7 °C (98.1 °F)    Resp 18    Ht 5' 5\" (1.651 m)    Wt 74.4 kg (164 lb)    SpO2 98%    Breastfeeding No    BMI 27.29 kg/m2       Patient is status post MAC anesthesia for Procedure(s):  COLONOSCOPY  ENDOSCOPIC POLYPECTOMY. Nausea/Vomiting: None    Postoperative hydration reviewed and adequate. Pain:  Pain Scale 1: Numeric (0 - 10) (08/13/18 0724)  Pain Intensity 1: 0 (08/13/18 0724)   Managed    Neurological Status: At baseline    Mental Status and Level of Consciousness: Alert and oriented     Pulmonary Status:   O2 Device: Room air (08/13/18 0825)   Adequate oxygenation and airway patent    Complications related to anesthesia: None    Post-anesthesia assessment completed.  No concerns    Signed By: Funmilayo Hammonds DO     August 13, 2018

## 2018-12-05 DIAGNOSIS — F34.1 DYSTHYMIA: ICD-10-CM

## 2018-12-05 RX ORDER — FLUOXETINE 10 MG/1
10 CAPSULE ORAL
Qty: 15 CAP | Refills: 5 | Status: SHIPPED | OUTPATIENT
Start: 2018-12-05 | End: 2019-01-22 | Stop reason: SDUPTHER

## 2018-12-05 NOTE — TELEPHONE ENCOUNTER
----- Message from Melvin Smoke sent at 12/5/2018 11:06 AM EST -----  Regarding: Dr. Harless Holter Morning, The Pt is requesting a refill for Rx \"Fluoxetine 10mg\" [facility] [address] at [phone].   Best contact: [phone]

## 2019-01-22 ENCOUNTER — OFFICE VISIT (OUTPATIENT)
Dept: INTERNAL MEDICINE CLINIC | Age: 69
End: 2019-01-22

## 2019-01-22 VITALS
HEART RATE: 53 BPM | DIASTOLIC BLOOD PRESSURE: 63 MMHG | BODY MASS INDEX: 28.49 KG/M2 | HEIGHT: 65 IN | WEIGHT: 171 LBS | RESPIRATION RATE: 16 BRPM | OXYGEN SATURATION: 99 % | SYSTOLIC BLOOD PRESSURE: 125 MMHG | TEMPERATURE: 97.8 F

## 2019-01-22 DIAGNOSIS — F34.1 DYSTHYMIA: ICD-10-CM

## 2019-01-22 DIAGNOSIS — M25.562 CHRONIC PAIN OF LEFT KNEE: Primary | ICD-10-CM

## 2019-01-22 DIAGNOSIS — M47.816 SPONDYLOSIS OF LUMBAR REGION WITHOUT MYELOPATHY OR RADICULOPATHY: ICD-10-CM

## 2019-01-22 DIAGNOSIS — G89.29 CHRONIC PAIN OF LEFT KNEE: Primary | ICD-10-CM

## 2019-01-22 DIAGNOSIS — Z23 NEED FOR SHINGLES VACCINE: ICD-10-CM

## 2019-01-22 RX ORDER — FLUOXETINE 10 MG/1
10 CAPSULE ORAL
Qty: 45 CAP | Refills: 1 | Status: SHIPPED | OUTPATIENT
Start: 2019-01-22 | End: 2019-08-01 | Stop reason: SDUPTHER

## 2019-01-22 RX ORDER — SULINDAC 200 MG/1
200 TABLET ORAL
Qty: 30 TAB | Refills: 5 | Status: SHIPPED | OUTPATIENT
Start: 2019-01-22 | End: 2019-08-01 | Stop reason: SDUPTHER

## 2019-01-22 NOTE — PROGRESS NOTES
HISTORY OF PRESENT ILLNESS Joshua  is a 76 y.o. female. HPI Dictation on: 01/22/2019 11:35 AM by: Kandace Anderson Review of Systems Musculoskeletal: Positive for joint pain. Negative for falls. Neurological: Negative for sensory change and focal weakness. Psychiatric/Behavioral: Negative for depression. The patient is not nervous/anxious and does not have insomnia. Physical Exam  
Constitutional: She is oriented to person, place, and time. She appears well-developed and well-nourished. Musculoskeletal:  
Dec rom left knee dec flexion Able to squat and lift leg from quadriceps Neurological: She is alert and oriented to person, place, and time. Psychiatric: She has a normal mood and affect. Her behavior is normal. Judgment and thought content normal.  
Nursing note and vitals reviewed. ASSESSMENT and PLAN Diagnoses and all orders for this visit: 
 
1. Chronic pain of left knee 
-     REFERRAL TO PHYSICAL THERAPY 
-     XR KNEE LT MAX 2 VWS; Future 2. Dysthymia -     FLUoxetine (PROZAC) 10 mg capsule; Take 1 Cap by mouth every fourty-eight (48) hours. 3. Need for shingles vaccine 
-     varicella-zoster [facility]) injection; 1 Each by IntraMUSCular route once for 1 dose. 4. Spondylosis of lumbar region without myelopathy or radiculopathy 
-     sulindac (CLINORIL) 200 mg tablet; Take 1 Tab by mouth daily as needed. Appt in 6mo

## 2019-01-23 ENCOUNTER — HOSPITAL ENCOUNTER (OUTPATIENT)
Dept: GENERAL RADIOLOGY | Age: 69
Discharge: HOME | End: 2019-01-23
Attending: INTERNAL MEDICINE
Payer: MEDICARE

## 2019-01-23 ENCOUNTER — HOSPITAL ENCOUNTER (OUTPATIENT)
Dept: PHYSICAL THERAPY | Age: 69
Discharge: HOME | End: 2019-01-23
Payer: MEDICARE

## 2019-01-23 DIAGNOSIS — G89.29 CHRONIC PAIN OF LEFT KNEE: ICD-10-CM

## 2019-01-23 DIAGNOSIS — M25.562 CHRONIC PAIN OF LEFT KNEE: ICD-10-CM

## 2019-01-23 PROCEDURE — 73562 X-RAY EXAM OF KNEE 3: CPT

## 2019-01-23 PROCEDURE — 97110 THERAPEUTIC EXERCISES: CPT | Performed by: PHYSICAL THERAPIST

## 2019-01-23 PROCEDURE — 97162 PT EVAL MOD COMPLEX 30 MIN: CPT | Performed by: PHYSICAL THERAPIST

## 2019-01-23 NOTE — PROGRESS NOTES
Can you notify her of xr showing djd with spurs and some fluid in knee joint-I do think this is causing her sxs and she should see ortho-dr ezekiel segal or Brittaney Hernandez would be good options for t his

## 2019-01-23 NOTE — PROGRESS NOTES
PT INITIAL EVALUATION NOTE - [facility] 2-15 Patient Name: Patsy Taylor 
Date:2019 : 1950 [x]  Patient  Verified [place]: [place] / Plan: Kelsie Sanchez y / Product Type: Medicare / In time:8:10 AM  Out time:9:00 AM 
Total Treatment Time (min): 50 Total Timed Codes (min): 30 
1:1 Treatment Time ( only): 50 Visit #: 1 Treatment Area: Pain in left knee [M25.562] SUBJECTIVE Pain Level (0-10 scale): 2/10 knee pain Any medication changes, allergies to medications, adverse drug reactions, diagnosis change, or new procedure performed?: [] No    [x] Yes (see summary sheet for update) Subjective:   
Chronic L knee pain PLOF: No limitations with squatting, standing, walking Mechanism of Injury: Insidious, the pain began in 2018. It radiates from the lateral hip and thigh into the lateral knee. It is made worse with exercise, specifically kicking with fins in the pool and weight machines. Previous Treatment/Compliance: She was evaluated by her PCP, Dr. Karla Duff, and was referred to PT to review a HEP. PMHx/Surgical Hx: Osteoporosis Work Hx: Retired Living Situation: lives at home with family Pt Goals: to reduce pain and improve independence in treating this condition Barriers: chronicity Motivation: motivated Substance use: none FABQ Score: n/a Cognition: A & O x 4 OBJECTIVE/EXAMINATION Gait and Functional Mobility: 
Gait: WNL Squat: Unable to achieve 50% of available ROM due to pain Palpation: Slight TTP along the lateral joint line of left knee Swelling:None Joint Mobility: Patellofemoral: WNL 
 
ROM: Knee, WNL 
 
MMT:    R  L Hip abduction  5/5  4/5 Gluts    5/5  4/5 HS   5/5  4/5 Quadriceps  5/5  4/5 Neurological: Sensation: Intact 25 min Therapeutic Exercise:  [x] See flow sheet :  
Rationale: increase ROM, increase strength and improve coordination to improve the patients ability to walk without pain With 
 [] TE 
 [] TA 
 [] neuro 
 [] other: Patient Education: [x] Review HEP [] Progressed/Changed HEP based on:  
[] positioning   [] body mechanics   [] transfers   [] heat/ice application   
[x] other: reviewed appropriate weight machines to improve quad and hip strength at her local gym Other Objective/Functional Measures: 
 
Pain Level (0-10 scale) post treatment: 0 
 
ASSESSMENT/Changes in Function:  
 
[x]  See Plan of Care Belen Dugan PT , DPT, OCS, Cert. DN 
 1/23/2019

## 2019-01-24 NOTE — PROGRESS NOTES
[facility] Physical Therapy [address] Phone: [phone]  Fax: [phone] Plan of Care/Statement of Necessity for Physical Therapy Services  2-15 Patient name: Wendy Nuñez  : 1950  Provider#: [id number] Referral source: Kristel Young MD     
Medical/Treatment Diagnosis: Pain in left knee [M25.562] Prior Hospitalization: see medical history Comorbidities: Osteoporosis Prior Level of Function: see initial eval 
Medications: Verified on Patient Summary List 
Start of Care: 18      Onset Date: 2018 The Plan of Care and following information is based on the information from the initial evaluation. Assessment/ key information: Patient presents with L knee DJD that is causing decreased tolerance to weightbearing activities, such as squatting. Today she did very well with a therapeutic exercise program focused on strengthening of the quad and hip musculature. She should do very well with PT with a focus on gradual strengthening and education in applying concepts to her local gym. Evaluation Complexity History MEDIUM  Complexity : 1-2 comorbidities / personal factors will impact the outcome/ POC ; Examination MEDIUM Complexity : 3 Standardized tests and measures addressing body structure, function, activity limitation and / or participation in recreation  ;Presentation MEDIUM Complexity : Evolving with changing characteristics  ; Clinical Decision Making MEDIUM Complexity : FOTO score of 26-74 Overall Complexity Rating: MEDIUM Problem List: pain affecting function, decrease ROM, decrease strength, impaired gait/ balance, decrease ADL/ functional abilitiies, decrease activity tolerance, decrease flexibility/ joint mobility and decrease transfer abilities Treatment Plan may include any combination of the following: Therapeutic exercise, Therapeutic activities, Neuromuscular re-education, Physical agent/modality, Gait/balance training, Manual therapy, Patient education, Self Care training, Functional mobility training, Home safety training and Stair training Patient / Family readiness to learn indicated by: asking questions, trying to perform skills and interest 
Persons(s) to be included in education: patient (P) Barriers to Learning/Limitations: None Patient Goal (s): I want to be able to work out without pain.  Patient Self Reported Health Status: excellent Rehabilitation Potential: excellent Short Term Goals: To be accomplished in 8 treatments: 1. Patient will be able to walk two blocks with no knee pain or limitation. 2. Patient will be able to ambulate a curb with the L LE with no knee pain or limitation. 3. Patient will be able to perform a sit-to-stand transfer with no knee pain or limitation. Long Term Goals: To be accomplished in 16 treatments: 1. Patient will be able to walk 1/4 mile with no pain or limitation. 2. Patient will be able to squat through a full ROM with no pain or limitation. 3. Patient will be able to ambulate a flight of stairs with no pain or limitation. Frequency / Duration: Patient to be seen 2 times per week for 8 weeks. Patient/ Caregiver education and instruction: self care, activity modification and exercises 
 
[x]  Plan of care has been reviewed with PTA Certification Period: 1/24/19 - 4/24/19 Ronen Wright, PT , DPT, OCS, Cert. DN 
 1/24/2019  
 
________________________________________________________________________ I certify that the above Therapy Services are being furnished while the patient is under my care. I agree with the treatment plan and certify that this therapy is necessary. [de-identified] Signature:____________________  Date:____________Time: _________

## 2019-01-31 ENCOUNTER — HOSPITAL ENCOUNTER (OUTPATIENT)
Dept: PHYSICAL THERAPY | Age: 69
Discharge: HOME | End: 2019-01-31
Payer: MEDICARE

## 2019-01-31 PROCEDURE — 97016 VASOPNEUMATIC DEVICE THERAPY: CPT | Performed by: PHYSICAL MEDICINE & REHABILITATION

## 2019-01-31 PROCEDURE — 97110 THERAPEUTIC EXERCISES: CPT | Performed by: PHYSICAL MEDICINE & REHABILITATION

## 2019-01-31 NOTE — PROGRESS NOTES
PT DAILY TREATMENT NOTE - [facility] 2-15 Patient Name: Anaid Valerio 
Date:2019 : 1950 [x]  Patient  Verified Pay[place]: [place] / Plan: Kelsie Wallace / Product Type: Medicare / In time:8:00AM  Out time: 9:05AM 
Total Treatment Time (min): 65 Total Timed Codes (min): 50 
1:1 Treatment Time ( only): 50 Visit #: 2 Treatment Area: Pain in left knee [M25.562] SUBJECTIVE Pain Level (0-10 scale): 5 Any medication changes, allergies to medications, adverse drug reactions, diagnosis change, or new procedure performed?: [x] No    [] Yes (see summary sheet for update) Subjective functional status/changes:   [] No changes reported Pt c/o pain in her left knee. Pt states she works out at [employer] often. OBJECTIVE Modality rationale: decrease edema, decrease inflammation, decrease pain and increase tissue extensibility to improve the patients ability to increase ADL efficiency and improve walking/standing tolerance. Type Additional Details  
[] Estim: []Att   []Unatt        []TENS instruct []IFC  []Premod   []NMES []Other:  []w/US   []w/ice   []w/heat Position: Location:  
[]  Traction: [] Cervical       []Lumbar 
                     [] Prone          []Supine []Intermittent   []Continuous Lbs: 
[] before manual 
[] after manual 
[]w/heat  
[]  Ultrasound: []Continuous   [] Pulsed at: 
                         []1MHz   []3MHz Location: 
W/cm2:  
[] Paraffin Location:  
[]w/heat  
[]  Ice     []  Heat 
[]  Ice massage Position: Location:  
[]  Laser 
[]  Other: Position: Location:  
[x]  Vasopneumatic Device 15 mins Pressure:       [] lo [x] med [] hi  
Temperature: 34  
 
[x] Skin assessment post-treatment:  [x]intact []redness- no adverse reaction 
  []redness  adverse reaction:  
 
50 min Therapeutic Exercise:  [x] See flow sheet :  
 Rationale: increase ROM, increase strength, improve coordination, improve balance and increase proprioception to improve the patients ability to increase ADL efficiency and improve walking/standing tolerance. With 
 [x] TE 
 [] TA 
 [] neuro 
 [] other: Patient Education: [x] Review HEP [] Progressed/Changed HEP based on:  
[] positioning   [] body mechanics   [] transfers   [] heat/ice application   
[] other:   
 
Other Objective/Functional Measures: Pt requires mod vc/tc for proper body positioning during exercises. Max vcs for quad sets with cuff today. Pain Level (0-10 scale) post treatment: \"much better\" ASSESSMENT/Changes in Function:  
 
Patient will continue to benefit from skilled PT services to modify and progress therapeutic interventions, address functional mobility deficits, address ROM deficits, address strength deficits, analyze and address soft tissue restrictions, analyze and cue movement patterns, analyze and modify body mechanics/ergonomics, assess and modify postural abnormalities and address imbalance/dizziness to attain remaining goals. []  See Plan of Care 
[]  See progress note/recertification 
[]  See Discharge Summary Progress towards goals / Updated goals: 
Pt tolerates PT well and is progressing towards her STG/LTG. Pt will do well progressing her LE strengthening exercises on her next visit. Pt will become independent with her HEP by her next visit. PLAN [x]  Upgrade activities as tolerated     [x]  Continue plan of care [x]  Update interventions per flow sheet      
[]  Discharge due to:_ 
[]  Other:_   
 
ELIER Gomez 1/31/2019  
 
3 TE  
1 VC

## 2019-02-07 ENCOUNTER — HOSPITAL ENCOUNTER (OUTPATIENT)
Dept: PHYSICAL THERAPY | Age: 69
Discharge: HOME | End: 2019-02-07
Payer: MEDICARE

## 2019-02-07 PROCEDURE — 97110 THERAPEUTIC EXERCISES: CPT

## 2019-02-07 PROCEDURE — 97016 VASOPNEUMATIC DEVICE THERAPY: CPT

## 2019-02-07 NOTE — PROGRESS NOTES
PT DAILY TREATMENT NOTE - [facility] 2-15 Patient Name: Evaristo Carrington 
Date:2019 : 1950 [x]  Patient  Verified Payor: [place] MED[place] / Plan: Kelsie Wallace / Product Type: Medicare / In time: 8:00a  Out time: 9:05a Total Treatment Time (min): 65 Total Timed Codes (min): 55 
1:1 Treatment Time ( only): 55 Visit #: 3 Treatment Area: Pain in left knee [M25.562] SUBJECTIVE Pain Level (0-10 scale): 3 Any medication changes, allergies to medications, adverse drug reactions, diagnosis change, or new procedure performed?: [x] No    [] Yes (see summary sheet for update) Subjective functional status/changes:   [] No changes reported Patient reports she has been feeling better since last session and noticed decreased pain in her back since last session. OBJECTIVE Modality rationale: decrease edema, decrease inflammation, decrease pain and increase tissue extensibility to improve the patients ability to increase ADL efficiency and improve walking/standing tolerance. Type Additional Details  
[] Estim: []Att   []Unatt        []TENS instruct []IFC  []Premod   []NMES []Other:  []w/US   []w/ice   []w/heat Position: Location:  
[]  Traction: [] Cervical       []Lumbar 
                     [] Prone          []Supine []Intermittent   []Continuous Lbs: 
[] before manual 
[] after manual 
[]w/heat  
[]  Ultrasound: []Continuous   [] Pulsed at: 
                         []1MHz   []3MHz Location: 
W/cm2:  
[] Paraffin Location:  
[]w/heat  
[]  Ice     []  Heat 
[]  Ice massage Position: Location:  
[]  Laser 
[]  Other: Position: Location:  
[x]  Vasopneumatic Device 10 mins Pressure:       [] lo [x] med [] hi  
Temperature: 34  
 
[x] Skin assessment post-treatment:  [x]intact []redness- no adverse reaction 
  []redness  adverse reaction:  
 
55 min Therapeutic Exercise:  [x] See flow sheet :  
 Rationale: increase ROM, increase strength, improve coordination, improve balance and increase proprioception to improve the patients ability to increase ADL efficiency and improve walking/standing tolerance. With 
 [x] TE 
 [] TA 
 [] neuro 
 [] other: Patient Education: [x] Review HEP [] Progressed/Changed HEP based on:  
[] positioning   [] body mechanics   [] transfers   [] heat/ice application   
[] other:   
 
Other Objective/Functional Measures: pt required mod verbal cues for proper exercise reproduction to prevent knee pain and abdominal activation to prevent back irritation with standing interventions *Vaso d/c at ten minutes due to increased achy feeling Pain Level (0-10 scale) post treatment: \"little achy\" ASSESSMENT/Changes in Function:  
 
Patient will continue to benefit from skilled PT services to modify and progress therapeutic interventions, address functional mobility deficits, address ROM deficits, address strength deficits, analyze and address soft tissue restrictions, analyze and cue movement patterns, analyze and modify body mechanics/ergonomics, assess and modify postural abnormalities and address imbalance/dizziness to attain remaining goals. []  See Plan of Care 
[]  See progress note/recertification 
[]  See Discharge Summary Progress towards goals / Updated goals:  
Patient demonstrates good tolerance for standing interventions with increased fatigue noted bye end of session requiring increased verbal cues for proper mechanics. PLAN [x]  Upgrade activities as tolerated     [x]  Continue plan of care [x]  Update interventions per flow sheet      
[]  Discharge due to:_ 
[]  Other:_ Le Koch, PTA 2/7/2019

## 2019-02-14 ENCOUNTER — HOSPITAL ENCOUNTER (OUTPATIENT)
Dept: PHYSICAL THERAPY | Age: 69
Discharge: HOME | End: 2019-02-14
Payer: MEDICARE

## 2019-02-14 PROCEDURE — 97110 THERAPEUTIC EXERCISES: CPT | Performed by: PHYSICAL MEDICINE & REHABILITATION

## 2019-02-14 NOTE — PROGRESS NOTES
PT DAILY TREATMENT NOTE - [facility] 2-15 Patient Name: Margarito Guardado 
Date:2019 : 1950 [x]  Patient  Verified Payor: [place] MEDICARE / Plan: Kelsie Tellespako / Product Type: Medicare / In time:8:00AM  Out time:9:00AM 
Total Treatment Time (min): 60 Total Timed Codes (min): 45 
1:1 Treatment Time ([facility] only): 45 Visit #: 4 Treatment Area: Pain in left knee [M25.562] SUBJECTIVE Pain Level (0-10 scale): 0 Any medication changes, allergies to medications, adverse drug reactions, diagnosis change, or new procedure performed?: [x] No    [] Yes (see summary sheet for update) Subjective functional status/changes:   [] No changes reported Pt states she has not had any pain in her knee or hip in the past week. Pt states lumbar flexion str with the swb was uncomfortable on her previous PT visit and left her sore for the remainder of the day. Pt states she swims at the pool for 45 mins about 2-3 times a week. OBJECTIVE Modality rationale: decrease edema, decrease inflammation, decrease pain and increase tissue extensibility to improve the patients ability to increase ADL efficiency and improve walking/standing tolerance. Min Type Additional Details  
 [] Estim: []Att   []Unatt        []TENS instruct []IFC  []Premod   []NMES []Other:  []w/US   []w/ice   []w/heat Position: Location:  
 []  Traction: [] Cervical       []Lumbar 
                     [] Prone          []Supine []Intermittent   []Continuous Lbs: 
[] before manual 
[] after manual 
[]w/heat  
 []  Ultrasound: []Continuous   [] Pulsed at: 
                         []1MHz   []3MHz Location: 
W/cm2:  
 [] Paraffin Location:  
[]w/heat  
15 []  Ice     [x]  Heat 
[]  Ice massage Position: Supine Location: B knees  
 []  Laser 
[]  Other: Position: Location:  
 
 []  Vasopneumatic Device Pressure:       [] lo [] med [] hi  
Temperature: [x] Skin assessment post-treatment:  [x]intact []redness- no adverse reaction 
  []redness  adverse reaction:  
 
45 min Therapeutic Exercise:  [x] See flow sheet :  
Rationale: increase ROM, increase strength, improve coordination, improve balance and increase proprioception to improve the patients ability to increase ADL efficiency and improve walking/standing tolerance. With 
 [x] TE 
 [] TA 
 [] neuro 
 [] other: Patient Education: [x] Review HEP [] Progressed/Changed HEP based on:  
[] positioning   [] body mechanics   [] transfers   [] heat/ice application   
[] other:   
 
Other Objective/Functional Measures: Pt requires min vc/tc for proper body positioning during exercises. Pain Level (0-10 scale) post treatment: 0 
 
ASSESSMENT/Changes in Function:  
 
Patient will continue to benefit from skilled PT services to modify and progress therapeutic interventions, address functional mobility deficits, address ROM deficits, address strength deficits, analyze and address soft tissue restrictions, analyze and cue movement patterns, analyze and modify body mechanics/ergonomics and assess and modify postural abnormalities to attain remaining goals. []  See Plan of Care 
[]  See progress note/recertification 
[]  See Discharge Summary Progress towards goals / Updated goals: 
Pt tolerates PT well and is progressing towards her STG/LTG. Pt is able to swim 2-3 times a week with no increase in pain. Pt is independent with HEP. Pt will do well progressing LE strengthening exercises on her next visit. PLAN [x]  Upgrade activities as tolerated     [x]  Continue plan of care [x]  Update interventions per flow sheet      
[]  Discharge due to:_ 
[]  Other:_   
 
ELIER Brandon 2/14/2019  
 
3 TE

## 2019-02-21 ENCOUNTER — APPOINTMENT (OUTPATIENT)
Dept: PHYSICAL THERAPY | Age: 69
End: 2019-02-21
Payer: MEDICARE

## 2019-03-04 ENCOUNTER — HOSPITAL ENCOUNTER (OUTPATIENT)
Dept: PHYSICAL THERAPY | Age: 69
Discharge: HOME | End: 2019-03-04
Payer: MEDICARE

## 2019-03-04 PROCEDURE — 97110 THERAPEUTIC EXERCISES: CPT

## 2019-03-04 NOTE — PROGRESS NOTES
[facility] [address], [address] Phone: [phone] Fax: [phone] Discharge Summary 2-15 Patient name: Linda Aguilar  : 1950  Provider#: [id number] Referral source: Ken Barrientos MD     
Medical/Treatment Diagnosis: Pain in left knee [M25.562] Prior Hospitalization: see medical history Comorbidities: See Plan of Care Prior Level of Function: See Plan of Care Medications: Verified on Patient Summary List 
 
Start of Care: 19      Onset Date:2018 Visits from Start of Care: 5     Missed Visits: 1 Reporting Period : 19 to 3/4/19 Assessment/Summary of care: The patient has completed 5 physical therapy visits and has met all short and long term goals. The patient scored a 84% on the FOTO  survey, a significant improvement from initial evaluation score of 64%. The patient has maximized therapeutic benefit at this time and is ready for discharge to [facility]. Short Term Goals: To be accomplished in 8 treatments: 1. Patient will be able to walk two blocks with no knee pain or limitation. Met 2. Patient will be able to ambulate a curb with the L LE with no knee pain or limitation. Met 3. Patient will be able to perform a sit-to-stand transfer with no knee pain or limitation. Met Long Term Goals: To be accomplished in 16 treatments: 1. Patient will be able to walk 1/4 mile with no pain or limitation. Met 2. Patient will be able to squat through a full ROM with no pain or limitation. Met 3. Patient will be able to ambulate a flight of stairs with no pain or limitation. Met Frequency / Duration: Patient to be seen 2 times per week for 8 weeks. 
  
 
 
 
 
 
RECOMMENDATIONS: 
[x]Discontinue therapy: [x]Patient has reached or is progressing toward set goals []Patient is non-compliant or has abdicated 
   []Due to lack of appreciable progress towards set goals []Other Ronen Wright, PT , DPT, OCS, Cert. DN 
 3/4/2019 100

## 2019-03-04 NOTE — PROGRESS NOTES
PT DAILY TREATMENT NOTE - [facility] 2-15 Patient Name: Fernando Hamilton 
Date:3/4/2019 : 1950 [x]  Patient  Verified Payor: [place] MEDICARE / Plan: Kelsie Tellespako / Product Type: Medicare / In time:10:00a  Out time:10:45a Total Treatment Time (min): 45 Total Timed Codes (min): 45 
1:1 Treatment Time ( only): 45 Visit #: 0 Treatment Area: Pain in left knee [M25.562] SUBJECTIVE Pain Level (0-10 scale): 0 Any medication changes, allergies to medications, adverse drug reactions, diagnosis change, or new procedure performed?: [x] No    [] Yes (see summary sheet for update) Subjective functional status/changes:   [] No changes reported Pt reports she was unable to do the exercises while she was sick and noticed some pain, but since starting the exercises again she has not pain and feels good. OBJECTIVE 45 min Therapeutic Exercise:  [x] See flow sheet :  
Rationale: increase ROM, increase strength, improve coordination, improve balance and increase proprioception to improve the patients ability to increase ADL efficiency and improve walking/standing tolerance. With 
 [x] TE 
 [] TA 
 [] neuro 
 [] other: Patient Education: [x] Review HEP [] Progressed/Changed HEP based on:  
[] positioning   [] body mechanics   [] transfers   [] heat/ice application   
[] other:   
 
Other Objective/Functional Measures: HEP updated Pain Level (0-10 scale) post treatment: 0 
 
ASSESSMENT/Changes in Function:  The patient has completed 5 physical therapy visits and has met all short and long term goals. The patient scored a 84% on the FOTO  survey, a significant improvement from initial evaluation score of 64%. The patient has maximized therapeutic benefit at this time and is ready for discharge to independent [facility]. []  See Plan of Care 
[]  See progress note/recertification 
[x]  See Discharge Summary Progress towards goals / Updated goals: Short Term Goals: To be accomplished in 8 treatments: 1. Patient will be able to walk two blocks with no knee pain or limitation. Met 2. Patient will be able to ambulate a curb with the L LE with no knee pain or limitation. Met 3. Patient will be able to perform a sit-to-stand transfer with no knee pain or limitation. Met Long Term Goals: To be accomplished in 16 treatments: 1. Patient will be able to walk 1/4 mile with no pain or limitation. Met 2. Patient will be able to squat through a full ROM with no pain or limitation. Met 3. Patient will be able to ambulate a flight of stairs with no pain or limitation. Met Frequency / Duration: Patient to be seen 2 times per week for 8 weeks. PLAN 
[]  Upgrade activities as tolerated     []  Continue plan of care 
[]  Update interventions per flow sheet [x]  Discharge due to:_goals met 
[]  Other:_ Lian Madera, PTA 3/4/2019

## 2019-05-12 NOTE — ANESTHESIA PREPROCEDURE EVALUATION
Anesthetic History   No history of anesthetic complications            Review of Systems / Medical History  Patient summary reviewed and nursing notes reviewed    Pulmonary  Within defined limits                 Neuro/Psych             Comments: Anxiety/depression Cardiovascular  Within defined limits                Exercise tolerance: >4 METS     GI/Hepatic/Renal               Comments: Ulcerative colitis: remission Endo/Other        Arthritis     Other Findings              Physical Exam    Airway  Mallampati: II    Neck ROM: normal range of motion   Mouth opening: Normal     Cardiovascular    Rhythm: regular  Rate: normal         Dental    Dentition: Caps/crowns     Pulmonary  Breath sounds clear to auscultation               Abdominal         Other Findings            Anesthetic Plan    ASA: 2  Anesthesia type: MAC          Induction: Intravenous  Anesthetic plan and risks discussed with: Patient      Informed consent obtained. 84 yr old Female form home lives with daughter independent at baseline with PMHX of dementia, HTN, HLD presented with dizziness and upper abdominal pain,bradycardia.   1.D/C Tele monitoring.  2.Stress test-r/o ischemia of MRI-.  3.Neurology f/u noted, await MRI.  4.Orthostatic -.  5.Bradycardia due to atenolol, hr stable .  6.Lipid d/o-statin.  7.Abdominal pain-GI eval.  8.HTN-Inc cozaar 25mg bid  9.Prediabestes-diet.  10.GI and DVT prophylaxis.

## 2019-08-01 ENCOUNTER — HOSPITAL ENCOUNTER (OUTPATIENT)
Dept: LAB | Age: 69
Discharge: HOME | End: 2019-08-01
Payer: MEDICARE

## 2019-08-01 ENCOUNTER — OFFICE VISIT (OUTPATIENT)
Dept: INTERNAL MEDICINE CLINIC | Age: 69
End: 2019-08-01

## 2019-08-01 VITALS
DIASTOLIC BLOOD PRESSURE: 65 MMHG | RESPIRATION RATE: 16 BRPM | BODY MASS INDEX: 28.16 KG/M2 | SYSTOLIC BLOOD PRESSURE: 104 MMHG | HEART RATE: 57 BPM | HEIGHT: 65 IN | OXYGEN SATURATION: 97 % | TEMPERATURE: 97.6 F | WEIGHT: 169 LBS

## 2019-08-01 DIAGNOSIS — F34.1 DYSTHYMIA: ICD-10-CM

## 2019-08-01 DIAGNOSIS — Z00.00 MEDICARE ANNUAL WELLNESS VISIT, SUBSEQUENT: Primary | ICD-10-CM

## 2019-08-01 DIAGNOSIS — Z12.39 BREAST CANCER SCREENING: ICD-10-CM

## 2019-08-01 DIAGNOSIS — M47.816 SPONDYLOSIS OF LUMBAR REGION WITHOUT MYELOPATHY OR RADICULOPATHY: ICD-10-CM

## 2019-08-01 DIAGNOSIS — R53.83 OTHER FATIGUE: ICD-10-CM

## 2019-08-01 PROCEDURE — 85025 COMPLETE CBC W/AUTO DIFF WBC: CPT

## 2019-08-01 PROCEDURE — 80053 COMPREHEN METABOLIC PANEL: CPT

## 2019-08-01 PROCEDURE — 84443 ASSAY THYROID STIM HORMONE: CPT

## 2019-08-01 PROCEDURE — 36415 COLL VENOUS BLD VENIPUNCTURE: CPT

## 2019-08-01 RX ORDER — FLUOXETINE 10 MG/1
10 CAPSULE ORAL
Qty: 45 CAP | Refills: 1 | Status: SHIPPED | OUTPATIENT
Start: 2019-08-01 | End: 2020-01-19

## 2019-08-01 RX ORDER — SULINDAC 200 MG/1
200 TABLET ORAL
Qty: 30 TAB | Refills: 2 | Status: SHIPPED | OUTPATIENT
Start: 2019-08-01 | End: 2021-04-19

## 2019-08-02 LAB
ALBUMIN SERPL-MCNC: 4.2 G/DL (ref 3.6–4.8)
ALBUMIN/GLOB SERPL: 1.8 {RATIO} (ref 1.2–2.2)
ALP SERPL-CCNC: 52 IU/L (ref 39–117)
ALT SERPL-CCNC: 5 IU/L (ref 0–32)
AST SERPL-CCNC: 13 IU/L (ref 0–40)
BASOPHILS # BLD AUTO: 0 X10E3/UL (ref 0–0.2)
BASOPHILS NFR BLD AUTO: 0 %
BILIRUB SERPL-MCNC: 0.4 MG/DL (ref 0–1.2)
BUN SERPL-MCNC: 12 MG/DL (ref 8–27)
BUN/CREAT SERPL: 16 (ref 12–28)
CALCIUM SERPL-MCNC: 9.2 MG/DL (ref 8.7–10.3)
CHLORIDE SERPL-SCNC: 99 MMOL/L (ref 96–106)
CO2 SERPL-SCNC: 25 MMOL/L (ref 20–29)
CREAT SERPL-MCNC: 0.73 MG/DL (ref 0.57–1)
EOSINOPHIL # BLD AUTO: 0.1 X10E3/UL (ref 0–0.4)
EOSINOPHIL NFR BLD AUTO: 3 %
ERYTHROCYTE [DISTWIDTH] IN BLOOD BY AUTOMATED COUNT: 13.7 % (ref 12.3–15.4)
GLOBULIN SER CALC-MCNC: 2.4 G/DL (ref 1.5–4.5)
GLUCOSE SERPL-MCNC: 88 MG/DL (ref 65–99)
HCT VFR BLD AUTO: 39.4 % (ref 34–46.6)
HGB BLD-MCNC: 13.2 G/DL (ref 11.1–15.9)
IMM GRANULOCYTES # BLD AUTO: 0 X10E3/UL (ref 0–0.1)
IMM GRANULOCYTES NFR BLD AUTO: 0 %
LYMPHOCYTES # BLD AUTO: 1.1 X10E3/UL (ref 0.7–3.1)
LYMPHOCYTES NFR BLD AUTO: 27 %
MCH RBC QN AUTO: 31.4 PG (ref 26.6–33)
MCHC RBC AUTO-ENTMCNC: 33.5 G/DL (ref 31.5–35.7)
MCV RBC AUTO: 94 FL (ref 79–97)
MONOCYTES # BLD AUTO: 0.4 X10E3/UL (ref 0.1–0.9)
MONOCYTES NFR BLD AUTO: 9 %
NEUTROPHILS # BLD AUTO: 2.4 X10E3/UL (ref 1.4–7)
NEUTROPHILS NFR BLD AUTO: 61 %
PLATELET # BLD AUTO: 281 X10E3/UL (ref 150–450)
POTASSIUM SERPL-SCNC: 4.5 MMOL/L (ref 3.5–5.2)
PROT SERPL-MCNC: 6.6 G/DL (ref 6–8.5)
RBC # BLD AUTO: 4.2 X10E6/UL (ref 3.77–5.28)
SODIUM SERPL-SCNC: 138 MMOL/L (ref 134–144)
TSH SERPL DL<=0.005 MIU/L-ACNC: 1.95 UIU/ML (ref 0.45–4.5)
WBC # BLD AUTO: 4.1 X10E3/UL (ref 3.4–10.8)

## 2019-08-02 NOTE — PROGRESS NOTES
This is the Subsequent Medicare Annual Wellness Exam, performed 12 months or more after the Initial AWV or the last Subsequent AWV    I have reviewed the patient's medical history in detail and updated the computerized patient record. History     Past Medical History:   Diagnosis Date    DJD (degenerative joint disease), lumbar 6/9/2009    Dysthymia 6/9/2009    Ulcerative colitis ([facility] [address] 6/9/2009      Past Surgical History:   Procedure Laterality Date    COLONOSCOPY N/A 8/13/2018    COLONOSCOPY performed by Mini Aguilar MD at [address] [facility] ENDOSCOPY, COLON, DIAGNOSTIC  11/29/11    dr Jose Burnett normal    HX GYN      vaginal birth     Current Outpatient Medications   Medication Sig Dispense Refill    FLUoxetine (PROZAC) 10 mg capsule Take 1 Cap by mouth every fourty-eight (48) hours. 45 Cap 1    sulindac (CLINORIL) 200 mg tablet Take 1 Tab by mouth daily as needed for Pain. 30 Tab 2    metroNIDAZOLE (METROCREAM) 0.75 % topical cream as directed.  SF 5000 PLUS 1.1 % crea as directed.  Cholecalciferol, Vitamin D3, (VITAMIN D3) 1,000 unit cap Take 2,000 Units by mouth. No Known Allergies  Family History   Problem Relation Age of Onset    Hypertension Sister      Social History     Tobacco Use    Smoking status: Never Smoker    Smokeless tobacco: Never Used   Substance Use Topics    Alcohol use: Yes     Alcohol/week: 5.0 standard drinks     Types: 6 Glasses of wine per week     Patient Active Problem List   Diagnosis Code    DJD (degenerative joint disease), lumbar M47.816    Dysthymia F34.1    Ulcerative colitis ([facility] [address] K51.90    Menopause Z78.0       Depression Risk Factor Screening:     3 most recent PHQ Screens 8/1/2019   Little interest or pleasure in doing things Not at all   Feeling down, depressed, irritable, or hopeless Not at all   Total Score PHQ 2 0     Alcohol Risk Factor Screening: You do not drink alcohol or very rarely.     Functional Ability and Level of Safety:   Hearing Loss  Hearing is good. Activities of Daily Living  The home contains: no safety equipment. Patient does total self care    Fall Risk  Fall Risk Assessment, last 12 mths 8/1/2019   Able to walk? Yes   Fall in past 12 months? No   Fall with injury? -       Abuse Screen  Patient is not abused    Cognitive Screening   Evaluation of Cognitive Function:  Has your family/caregiver stated any concerns about your memory: no  Normal    Patient Care Team   Patient Care Team:  Belia Palafox MD as PCP - General    Assessment/Plan   Education and counseling provided:  Are appropriate based on today's review and evaluation  End-of-Life planning (with patient's consent)  Pneumococcal Vaccine  Influenza Vaccine  Screening Mammography  Colorectal cancer screening tests  Bone mass measurement (DEXA)    Diagnoses and all orders for this visit:    1. Medicare annual wellness visit, subsequent    2. Breast cancer screening  -     [facility] 3D JENNIFER W MAMMO BI SCREENING INCL CAD; Future    3. Dysthymia  -     FLUoxetine (PROZAC) 10 mg capsule; Take 1 Cap by mouth every fourty-eight (48) hours. -     TSH 3RD GENERATION  -     CBC WITH AUTOMATED DIFF    4. Spondylosis of lumbar region without myelopathy or radiculopathy  -     sulindac (CLINORIL) 200 mg tablet; Take 1 Tab by mouth daily as needed for Pain.  -     METABOLIC PANEL, COMPREHENSIVE    5.  Other fatigue  -     TSH 3RD GENERATION  -     CBC WITH AUTOMATED DIFF        Health Maintenance Due   Topic Date Due    Hepatitis C Screening  1950    Shingrix Vaccine Age 50> (1 of 2) 08/25/2000    GLAUCOMA SCREENING Q2Y  08/25/2015    MEDICARE YEARLY EXAM  04/19/2019    Influenza Age 9 to Adult  08/01/2019

## 2019-08-02 NOTE — PROGRESS NOTES
HISTORY OF PRESENT ILLNESS  Shae Edmond is a 76 y.o. female. HPI  Seen for wellness visit and follow up on meds. She is taking Prozac 10 mg every other day. Energy and mood are good. She quilts and walks regularly and spirits are good. We are going to continue this dosing. Preventive care. Colonoscopy done in 2018, normal.  Pap smear done in 2015. She is low risk and will plan on repeating at five years, which is 2020. Mammogram is due. Bone density will be due in 2020. Review of Systems   Constitutional: Positive for malaise/fatigue. Negative for chills, fever and weight loss. HENT: Negative for hearing loss. Respiratory: Negative for cough, shortness of breath and wheezing. Cardiovascular: Negative for chest pain, palpitations, orthopnea, leg swelling and PND. Gastrointestinal: Negative for abdominal pain, heartburn and nausea. Musculoskeletal: Negative for myalgias. Neurological: Negative for dizziness and headaches. Psychiatric/Behavioral: Negative for depression and memory loss. The patient is not nervous/anxious and does not have insomnia. All other systems reviewed and are negative. Physical Exam   Constitutional: She is oriented to person, place, and time. She appears well-developed and well-nourished. HENT:   Head: Normocephalic and atraumatic. Right Ear: Tympanic membrane, external ear and ear canal normal.   Left Ear: Tympanic membrane, external ear and ear canal normal.   Nose: Nose normal.   Mouth/Throat: Oropharynx is clear and moist and mucous membranes are normal. No oropharyngeal exudate. Eyes: Pupils are equal, round, and reactive to light. Conjunctivae are normal. Right eye exhibits no discharge. Left eye exhibits no discharge. Neck: Normal range of motion. Neck supple. Carotid bruit is not present. No thyromegaly present. Cardiovascular: Normal rate, regular rhythm, S1 normal, S2 normal, normal heart sounds and intact distal pulses.    No murmur heard. Pulmonary/Chest: Effort normal and breath sounds normal. No respiratory distress. She has no wheezes. She has no rales. Breast exam bilaterally without masses axillary nodes or discharge. BSE reviewed      Abdominal: Soft. Bowel sounds are normal. There is no tenderness. There is no rebound. Musculoskeletal: She exhibits no edema. Lymphadenopathy:     She has no cervical adenopathy. Neurological: She is alert and oriented to person, place, and time. Skin: No rash noted. Psychiatric: She has a normal mood and affect. Her behavior is normal.   Nursing note and vitals reviewed. ASSESSMENT and PLAN  Diagnoses and all orders for this visit:    1. Medicare annual wellness visit, subsequent    2. Breast cancer screening  -     [facility] 3D JENNIFER W MAMMO BI SCREENING INCL CAD; Future    3. Dysthymia-stable on med cont dose  -     FLUoxetine (PROZAC) 10 mg capsule; Take 1 Cap by mouth every fourty-eight (48) hours. -     TSH 3RD GENERATION  -     CBC WITH AUTOMATED DIFF    4. Spondylosis of lumbar region without myelopathy or radiculopathy  -     sulindac (CLINORIL) 200 mg tablet; Take 1 Tab by mouth daily as needed for Pain.  -     METABOLIC PANEL, COMPREHENSIVE    5.  Other fatigue  -     TSH 3RD GENERATION  -     CBC WITH AUTOMATED DIFF

## 2019-08-02 NOTE — PATIENT INSTRUCTIONS
Medicare Wellness Visit, Female The best way to live healthy is to have a lifestyle where you eat a well-balanced diet, exercise regularly, limit alcohol use, and quit all forms of tobacco/nicotine, if applicable. Regular preventive services are another way to keep healthy. Preventive services (vaccines, screening tests, monitoring & exams) can help personalize your care plan, which helps you manage your own care. Screening tests can find health problems at the earliest stages, when they are easiest to treat. [facility] follows the current, evidence-based guidelines published by the [facility] ([facility]) when recommending preventive services for our patients. Because we follow these guidelines, sometimes recommendations change over time as research supports it. (For example, mammograms used to be recommended annually. Even though Medicare will still pay for an annual mammogram, the newer guidelines recommend a mammogram every two years for women of average risk.) Of course, you and your doctor may decide to screen more often for some diseases, based on your risk and your health status. Preventive services for you include: - Medicare offers their members a free annual wellness visit, which is time for you and your primary care provider to discuss and plan for your preventive service needs. Take advantage of this benefit every year! 
-All adults over the age of 72 should receive the recommended pneumonia vaccines. Current USPSTF guidelines recommend a series of two vaccines for the best pneumonia protection.  
-All adults should have a flu vaccine yearly and a tetanus vaccine every 10 years. All adults age 61 and older should receive a shingles vaccine once in their lifetime.   
-A bone mass density test is recommended when a woman turns 65 to screen for osteoporosis. This test is only recommended one time, as a screening. Some providers will use this same test as a disease monitoring tool if you already have osteoporosis. -All adults age 38-68 who are overweight should have a diabetes screening test once every three years.  
-Other screening tests and preventive services for persons with diabetes include: an eye exam to screen for diabetic retinopathy, a kidney function test, a foot exam, and stricter control over your cholesterol.  
-Cardiovascular screening for adults with routine risk involves an electrocardiogram (ECG) at intervals determined by your doctor.  
-Colorectal cancer screenings should be done for adults age 54-65 with no increased risk factors for colorectal cancer. There are a number of acceptable methods of screening for this type of cancer. Each test has its own benefits and drawbacks. Discuss with your doctor what is most appropriate for you during your annual wellness visit. The different tests include: colonoscopy (considered the best screening method), a fecal occult blood test, a fecal DNA test, and sigmoidoscopy. -Breast cancer screenings are recommended every other year for women of normal risk, age 54-69. 
-Cervical cancer screenings for women over age 72 are only recommended with certain risk factors.  
-All adults born between [facility] should be screened once for Hepatitis C. Here is a list of your current Health Maintenance items (your personalized list of preventive services) with a due date: 
Health Maintenance Due Topic Date Due  
 Hepatitis C Test  1950  Shingles Vaccine (1 of 2) 08/25/2000  Glaucoma Screening   08/25/2015 [facility] Annual Well Visit  04/19/2019  Flu Vaccine  08/01/2019

## 2019-08-14 ENCOUNTER — HOSPITAL ENCOUNTER (OUTPATIENT)
Dept: MAMMOGRAPHY | Age: 69
Discharge: HOME | End: 2019-08-14
Attending: INTERNAL MEDICINE
Payer: MEDICARE

## 2019-08-14 DIAGNOSIS — Z12.39 BREAST CANCER SCREENING: ICD-10-CM

## 2019-08-14 PROCEDURE — 77063 BREAST TOMOSYNTHESIS BI: CPT

## 2019-11-25 ENCOUNTER — OFFICE VISIT (OUTPATIENT)
Dept: INTERNAL MEDICINE CLINIC | Age: 69
End: 2019-11-25

## 2019-11-25 VITALS
TEMPERATURE: 98.4 F | OXYGEN SATURATION: 96 % | HEART RATE: 57 BPM | DIASTOLIC BLOOD PRESSURE: 72 MMHG | RESPIRATION RATE: 16 BRPM | SYSTOLIC BLOOD PRESSURE: 116 MMHG | HEIGHT: 65 IN | WEIGHT: 172 LBS | BODY MASS INDEX: 28.66 KG/M2

## 2019-11-25 DIAGNOSIS — R05.9 COUGH: Primary | ICD-10-CM

## 2019-11-25 DIAGNOSIS — J45.21 MILD INTERMITTENT REACTIVE AIRWAY DISEASE WITH ACUTE EXACERBATION: ICD-10-CM

## 2019-11-25 RX ORDER — ASPIRIN 650 MG
650 TABLET, DELAYED RELEASE (ENTERIC COATED) ORAL
COMMUNITY
End: 2021-10-15 | Stop reason: ALTCHOICE

## 2019-11-25 RX ORDER — DEXTROMETHORPHAN POLISTIREX 30 MG/5ML
60 SUSPENSION ORAL 2 TIMES DAILY
COMMUNITY
End: 2020-04-20 | Stop reason: ALTCHOICE

## 2019-11-25 RX ORDER — METHYLPREDNISOLONE 4 MG/1
TABLET ORAL
Qty: 1 DOSE PACK | Refills: 0 | Status: SHIPPED | OUTPATIENT
Start: 2019-11-25 | End: 2020-04-20 | Stop reason: ALTCHOICE

## 2019-11-25 RX ORDER — PSEUDOEPHEDRINE HCL 30 MG
TABLET ORAL
COMMUNITY
End: 2020-04-20 | Stop reason: ALTCHOICE

## 2019-11-25 RX ORDER — BENZONATATE 200 MG/1
200 CAPSULE ORAL
Qty: 30 CAP | Refills: 1 | Status: SHIPPED | OUTPATIENT
Start: 2019-11-25 | End: 2019-12-02

## 2019-11-25 RX ORDER — ALBUTEROL SULFATE 90 UG/1
1 AEROSOL, METERED RESPIRATORY (INHALATION)
Qty: 1 INHALER | Refills: 1 | Status: SHIPPED | OUTPATIENT
Start: 2019-11-25 | End: 2020-04-20 | Stop reason: ALTCHOICE

## 2019-11-25 NOTE — PROGRESS NOTES
HISTORY OF PRESENT ILLNESS  Taylor Horan is a 71 y.o. female. HPI  She has had a week of cough, now having spasms. No fevers or chills or headaches. It does not wake her from sleep. She is not short of breath. Review of Systems   Constitutional: Positive for malaise/fatigue. Negative for chills, diaphoresis and fever. HENT: Positive for congestion. Negative for ear discharge, ear pain, nosebleeds and sore throat. Eyes: Negative for pain and discharge. Respiratory: Positive for cough. Negative for hemoptysis, sputum production, shortness of breath and wheezing. Cardiovascular: Negative for chest pain. Neurological: Negative for headaches. Physical Exam  Vitals signs and nursing note reviewed. Constitutional:       Appearance: She is well-developed. HENT:      Head: Normocephalic. Right Ear: Tympanic membrane, ear canal and external ear normal.      Left Ear: Tympanic membrane, ear canal and external ear normal.      Nose: Nose normal.      Mouth/Throat:      Pharynx: No oropharyngeal exudate. Eyes:      General:         Right eye: No discharge. Left eye: No discharge. Conjunctiva/sclera: Conjunctivae normal.      Pupils: Pupils are equal, round, and reactive to light. Neck:      Musculoskeletal: Normal range of motion and neck supple. Cardiovascular:      Rate and Rhythm: Normal rate and regular rhythm. Heart sounds: Normal heart sounds. Pulmonary:      Effort: Pulmonary effort is normal. No respiratory distress. Breath sounds: Normal breath sounds. No wheezing or rales. Lymphadenopathy:      Cervical: No cervical adenopathy. ASSESSMENT and PLAN  Diagnoses and all orders for this visit:    1. Cough  -     methylPREDNISolone (MEDROL DOSEPACK) 4 mg tablet; Per pack  -     benzonatate (TESSALON) 200 mg capsule; Take 1 Cap by mouth three (3) times daily as needed for Cough for up to 7 days.   -     albuterol (PROVENTIL HFA, VENTOLIN HFA, PROAIR HFA) 90 mcg/actuation inhaler; Take 1 Puff by inhalation every six (6) hours as needed for Wheezing. 2. Mild intermittent reactive airway disease with acute exacerbation    Follow up if signs and symptoms worsen or change. After hours number given.

## 2019-11-26 ENCOUNTER — TELEPHONE (OUTPATIENT)
Dept: INTERNAL MEDICINE CLINIC | Age: 69
End: 2019-11-26

## 2019-11-26 NOTE — TELEPHONE ENCOUNTER
Notified patient of PCP's advice of transmission & what to avoid doing. Patient voiced understanding.

## 2019-11-26 NOTE — TELEPHONE ENCOUNTER
Patient was seen by PCP 11/25/19. Patient is requesting to know how contagious is her cold. Please call patient to advise.    [phone]

## 2019-11-26 NOTE — TELEPHONE ENCOUNTER
She does  Likely have a virus which is transmitted by respiratory droplets so avoid sharing sputum with others-no shared glasses no kissing on face etc  Ok to be in the same room

## 2019-12-03 ENCOUNTER — TELEPHONE (OUTPATIENT)
Dept: INTERNAL MEDICINE CLINIC | Age: 69
End: 2019-12-03

## 2019-12-03 DIAGNOSIS — R05.9 COUGH: Primary | ICD-10-CM

## 2019-12-03 RX ORDER — CODEINE PHOSPHATE AND GUAIFENESIN 10; 100 MG/5ML; MG/5ML
5 SOLUTION ORAL
Qty: 120 ML | Refills: 0 | Status: SHIPPED | OUTPATIENT
Start: 2019-12-03 | End: 2019-12-08

## 2019-12-03 NOTE — TELEPHONE ENCOUNTER
Ok-can you let her know I sent in codeine cough syrup to use qhs for next week  If not better in 1 week I should see her thanks

## 2019-12-03 NOTE — TELEPHONE ENCOUNTER
Pt is doing better but cough has now changed. It is a constant dry hacking cough, keeping her up at night. Medications sent in at appt are no longer helping.

## 2019-12-03 NOTE — TELEPHONE ENCOUNTER
----- Message from Mckenzie Alonso sent at 12/3/2019  7:36 AM EST -----  Regarding: Dr. Alexus Tran  Pt stated she was in to see the doctor for a cold last week and has had no relief. The current problem is related to the cough. She would like a prescription called into the [facility] on file.  Best contact number is [phone]

## 2020-01-19 DIAGNOSIS — F34.1 DYSTHYMIA: ICD-10-CM

## 2020-01-19 RX ORDER — FLUOXETINE 10 MG/1
CAPSULE ORAL
Qty: 45 CAP | Refills: 0 | Status: SHIPPED | OUTPATIENT
Start: 2020-01-19 | End: 2020-04-13

## 2020-04-12 DIAGNOSIS — F34.1 DYSTHYMIA: ICD-10-CM

## 2020-04-13 DIAGNOSIS — F34.1 DYSTHYMIA: ICD-10-CM

## 2020-04-13 RX ORDER — FLUOXETINE 10 MG/1
CAPSULE ORAL
Qty: 45 CAP | Refills: 0 | Status: SHIPPED | OUTPATIENT
Start: 2020-04-13 | End: 2020-04-20 | Stop reason: SDUPTHER

## 2020-04-13 RX ORDER — FLUOXETINE 10 MG/1
CAPSULE ORAL
Qty: 45 CAP | Refills: 0 | Status: SHIPPED | OUTPATIENT
Start: 2020-04-13 | End: 2020-04-13

## 2020-04-16 ENCOUNTER — TELEPHONE (OUTPATIENT)
Dept: INTERNAL MEDICINE CLINIC | Age: 70
End: 2020-04-16

## 2020-04-20 ENCOUNTER — VIRTUAL VISIT (OUTPATIENT)
Dept: INTERNAL MEDICINE CLINIC | Age: 70
End: 2020-04-20

## 2020-04-20 VITALS — WEIGHT: 152 LBS | BODY MASS INDEX: 25.29 KG/M2

## 2020-04-20 DIAGNOSIS — F34.1 DYSTHYMIA: ICD-10-CM

## 2020-04-20 RX ORDER — FLUOXETINE 10 MG/1
CAPSULE ORAL
Qty: 45 CAP | Refills: 1 | Status: SHIPPED | OUTPATIENT
Start: 2020-04-20 | End: 2020-12-03

## 2020-04-20 NOTE — PROGRESS NOTES
Phone call-during covid crisis  She is quilting for friends  She is doing a weight loss program since nov 2019-lost 14 pounds and feels well  Walking every day  Not needing the clinoril recently not having back pain  Uses prozac 10 mg every other day-feels well with this

## 2020-12-03 DIAGNOSIS — F34.1 DYSTHYMIA: ICD-10-CM

## 2020-12-03 RX ORDER — FLUOXETINE 10 MG/1
CAPSULE ORAL
Qty: 45 CAP | Refills: 0 | Status: SHIPPED | OUTPATIENT
Start: 2020-12-03 | End: 2021-02-04 | Stop reason: SDUPTHER

## 2021-02-04 ENCOUNTER — OFFICE VISIT (OUTPATIENT)
Dept: INTERNAL MEDICINE CLINIC | Age: 71
End: 2021-02-04
Payer: MEDICARE

## 2021-02-04 VITALS
HEART RATE: 51 BPM | SYSTOLIC BLOOD PRESSURE: 114 MMHG | OXYGEN SATURATION: 98 % | HEIGHT: 65 IN | DIASTOLIC BLOOD PRESSURE: 64 MMHG | BODY MASS INDEX: 24.32 KG/M2 | RESPIRATION RATE: 14 BRPM | WEIGHT: 146 LBS | TEMPERATURE: 97.5 F

## 2021-02-04 DIAGNOSIS — F34.1 DYSTHYMIA: ICD-10-CM

## 2021-02-04 DIAGNOSIS — H90.3 SENSORINEURAL HEARING LOSS (SNHL) OF BOTH EARS: ICD-10-CM

## 2021-02-04 DIAGNOSIS — E78.5 DYSLIPIDEMIA, GOAL LDL BELOW 100: ICD-10-CM

## 2021-02-04 DIAGNOSIS — R63.4 WEIGHT LOSS: ICD-10-CM

## 2021-02-04 DIAGNOSIS — Z00.00 MEDICARE ANNUAL WELLNESS VISIT, SUBSEQUENT: Primary | ICD-10-CM

## 2021-02-04 DIAGNOSIS — M17.10 ARTHRITIS OF KNEE: ICD-10-CM

## 2021-02-04 PROBLEM — H91.93 BILATERAL HEARING LOSS: Status: ACTIVE | Noted: 2021-02-04

## 2021-02-04 PROCEDURE — G0463 HOSPITAL OUTPT CLINIC VISIT: HCPCS | Performed by: INTERNAL MEDICINE

## 2021-02-04 PROCEDURE — G8536 NO DOC ELDER MAL SCRN: HCPCS | Performed by: INTERNAL MEDICINE

## 2021-02-04 PROCEDURE — G8420 CALC BMI NORM PARAMETERS: HCPCS | Performed by: INTERNAL MEDICINE

## 2021-02-04 PROCEDURE — 1101F PT FALLS ASSESS-DOCD LE1/YR: CPT | Performed by: INTERNAL MEDICINE

## 2021-02-04 PROCEDURE — G9717 DOC PT DX DEP/BP F/U NT REQ: HCPCS | Performed by: INTERNAL MEDICINE

## 2021-02-04 PROCEDURE — 1090F PRES/ABSN URINE INCON ASSESS: CPT | Performed by: INTERNAL MEDICINE

## 2021-02-04 PROCEDURE — G0439 PPPS, SUBSEQ VISIT: HCPCS | Performed by: INTERNAL MEDICINE

## 2021-02-04 PROCEDURE — G8427 DOCREV CUR MEDS BY ELIG CLIN: HCPCS | Performed by: INTERNAL MEDICINE

## 2021-02-04 PROCEDURE — G8399 PT W/DXA RESULTS DOCUMENT: HCPCS | Performed by: INTERNAL MEDICINE

## 2021-02-04 PROCEDURE — 99214 OFFICE O/P EST MOD 30 MIN: CPT | Performed by: INTERNAL MEDICINE

## 2021-02-04 PROCEDURE — 3017F COLORECTAL CA SCREEN DOC REV: CPT | Performed by: INTERNAL MEDICINE

## 2021-02-04 RX ORDER — FLUOXETINE 10 MG/1
CAPSULE ORAL
Qty: 45 CAP | Refills: 0 | Status: SHIPPED | OUTPATIENT
Start: 2021-02-04 | End: 2021-05-25

## 2021-02-05 NOTE — PATIENT INSTRUCTIONS
Medicare Wellness Visit, Female The best way to live healthy is to have a lifestyle where you eat a well-balanced diet, exercise regularly, limit alcohol use, and quit all forms of tobacco/nicotine, if applicable. Regular preventive services are another way to keep healthy. Preventive services (vaccines, screening tests, monitoring & exams) can help personalize your care plan, which helps you manage your own care. Screening tests can find health problems at the earliest stages, when they are easiest to treat. [facility] follows the current, evidence-based guidelines published by the [facility] ([facility]) when recommending preventive services for our patients. Because we follow these guidelines, sometimes recommendations change over time as research supports it. (For example, mammograms used to be recommended annually. Even though Medicare will still pay for an annual mammogram, the newer guidelines recommend a mammogram every two years for women of average risk). Of course, you and your doctor may decide to screen more often for some diseases, based on your risk and your co-morbidities (chronic disease you are already diagnosed with). Preventive services for you include: - Medicare offers their members a free annual wellness visit, which is time for you and your primary care provider to discuss and plan for your preventive service needs. Take advantage of this benefit every year! 
-All adults over the age of 72 should receive the recommended pneumonia vaccines. Current USPSTF guidelines recommend a series of two vaccines for the best pneumonia protection.  
-All adults should have a flu vaccine yearly and a tetanus vaccine every 10 years.  
-All adults age 48 and older should receive the shingles vaccines (series of two vaccines). -All adults age 38-68 who are overweight should have a diabetes screening test once every three years. -All adults born between [birth year] and 1965 should be screened once for Hepatitis C. 
-Other screening tests and preventive services for persons with diabetes include: an eye exam to screen for diabetic retinopathy, a kidney function test, a foot exam, and stricter control over your cholesterol.  
-Cardiovascular screening for adults with routine risk involves an electrocardiogram (ECG) at intervals determined by your doctor.  
-Colorectal cancer screenings should be done for adults age 54-65 with no increased risk factors for colorectal cancer. There are a number of acceptable methods of screening for this type of cancer. Each test has its own benefits and drawbacks. Discuss with your doctor what is most appropriate for you during your annual wellness visit. The different tests include: colonoscopy (considered the best screening method), a fecal occult blood test, a fecal DNA test, and sigmoidoscopy. 
 
-A bone mass density test is recommended when a woman turns 65 to screen for osteoporosis. This test is only recommended one time, as a screening. Some providers will use this same test as a disease monitoring tool if you already have osteoporosis. -Breast cancer screenings are recommended every other year for women of normal risk, age 54-69. 
-Cervical cancer screenings for women over age 72 are only recommended with certain risk factors. Here is a list of your current Health Maintenance items (your personalized list of preventive services) with a due date: 
Health Maintenance Due Topic Date Due  
 Hepatitis C Test  1950  Shingles Vaccine (1 of 2) 08/25/2000  Glaucoma Screening   08/25/2015  DTaP/Tdap/Td  (2 - Td) 09/08/2019

## 2021-02-05 NOTE — PROGRESS NOTES
HISTORY OF PRESENT ILLNESS  Sanjiv Richard is a 79 y.o. female. HPI  Doing well-lost 24 pounds intentionally-swims 3 times a week and walks and quilts-feels happy on low dose every other day prozac and no trouble with sleep. Some oa knee and rately uses the clinoril. Declines hep c screen-low risk. Due for labs. Uses metrocream for rosacea. mammo in 8/19-declines this year. dexa in 2018  No pap needed  Review of Systems   Constitutional: Positive for weight loss. Negative for chills and fever. HENT: Positive for hearing loss. Respiratory: Negative for cough, shortness of breath and wheezing. Cardiovascular: Negative for chest pain, palpitations, orthopnea, leg swelling and PND. Gastrointestinal: Negative for heartburn and nausea. Musculoskeletal: Positive for joint pain. Negative for myalgias. Neurological: Negative for dizziness and headaches. Psychiatric/Behavioral: Negative for depression. The patient does not have insomnia. All other systems reviewed and are negative. Physical Exam  Vitals signs and nursing note reviewed. Constitutional:       Appearance: She is well-developed. HENT:      Head: Normocephalic and atraumatic. Right Ear: Tympanic membrane and ear canal normal.      Left Ear: Tympanic membrane and ear canal normal.   Eyes:      Extraocular Movements: Extraocular movements intact. Pupils: Pupils are equal, round, and reactive to light. Neck:      Musculoskeletal: Normal range of motion and neck supple. Thyroid: No thyromegaly. Vascular: No carotid bruit. Cardiovascular:      Rate and Rhythm: Normal rate and regular rhythm. Heart sounds: Normal heart sounds, S1 normal and S2 normal. No murmur. Pulmonary:      Effort: Pulmonary effort is normal. No respiratory distress. Breath sounds: Normal breath sounds. No wheezing or rales. Abdominal:      General: Abdomen is flat. Bowel sounds are normal.      Palpations: Abdomen is soft. There is no mass. Genitourinary:     Comments: Breast exam bilaterally without masses axillary nodes or discharge. BSE reviewed     Musculoskeletal:      Right lower leg: No edema. Left lower leg: No edema. Lymphadenopathy:      Cervical: No cervical adenopathy. Neurological:      Mental Status: She is alert and oriented to person, place, and time. Psychiatric:         Mood and Affect: Mood normal.         Behavior: Behavior normal.         ASSESSMENT and PLAN  Diagnoses and all orders for this visit:    1. Medicare annual wellness visit, subsequent    2. Arthritis of knee  -     VITAMIN D, 25 HYDROXY; Future    3. Dysthymia-doing well  -     FLUoxetine (PROzac) 10 mg capsule; 1 po qod    4. Weight loss-intentional with great diet and exercise  -     TSH 3RD GENERATION; Future  -     CBC WITH AUTOMATED DIFF; Future    5. Dyslipidemia, goal LDL below 048  -     METABOLIC PANEL, COMPREHENSIVE; Future  -     LIPID PANEL; Future    6. Sensorineural hearing loss (SNHL) of both ears    This is the Subsequent Medicare Annual Wellness Exam, performed 12 months or more after the Initial AWV or the last Subsequent AWV    I have reviewed the patient's medical history in detail and updated the computerized patient record. Depression Risk Factor Screening:     3 most recent PHQ Screens 2/4/2021   Little interest or pleasure in doing things Not at all   Feeling down, depressed, irritable, or hopeless Not at all   Total Score PHQ 2 0       Alcohol Risk Screen    Do you average more than 1 drink per night or more than 7 drinks a week:  No    On any one occasion in the past three months have you have had more than 3 drinks containing alcohol:  No        Functional Ability and Level of Safety:    Hearing: The patient wears hearing aids. Activities of Daily Living: The home contains: no safety equipment.   Patient does total self care      Ambulation: with no difficulty     Fall Risk:  Fall Risk Assessment, last 12 mths 2/4/2021   Able to walk? Yes   Fall in past 12 months? 1   Do you feel unsteady? 0   Are you worried about falling 0   Number of falls in past 12 months 1   Fall with injury? 1      Abuse Screen:  Patient is not abused       Cognitive Screening    Has your family/caregiver stated any concerns about your memory: no     Cognitive Screening: Normal - Verbal Fluency Test    Assessment/Plan   Education and counseling provided:  Are appropriate based on today's review and evaluation  Pneumococcal Vaccine  Influenza Vaccine  Screening Mammography  Bone mass measurement (DEXA)    Diagnoses and all orders for this visit:    1. Medicare annual wellness visit, subsequent    2. Arthritis of knee  -     VITAMIN D, 25 HYDROXY; Future    3. Dysthymia  -     FLUoxetine (PROzac) 10 mg capsule; 1 po qod    4. Weight loss  -     TSH 3RD GENERATION; Future  -     CBC WITH AUTOMATED DIFF; Future    5. Dyslipidemia, goal LDL below 585  -     METABOLIC PANEL, COMPREHENSIVE; Future  -     LIPID PANEL; Future    6.  Sensorineural hearing loss (SNHL) of both ears        Health Maintenance Due     Health Maintenance Due   Topic Date Due    Hepatitis C Screening  1950    Shingrix Vaccine Age 50> (1 of 2) 08/25/2000    GLAUCOMA SCREENING Q2Y  08/25/2015    DTaP/Tdap/Td series (2 - Td) 09/08/2019    Medicare Yearly Exam  08/01/2020       Patient Care Team   Patient Care Team:  Bre Chau MD as PCP - [facility]  Bre Chau MD as PCP - [facility] Empaneled Provider    History     Patient Active Problem List   Diagnosis Code    DJD (degenerative joint disease), lumbar M47.816    Dysthymia F34.1    Ulcerative colitis ([address] K51.90    Menopause Z78.0    Bilateral hearing loss H91.93     Past Medical History:   Diagnosis Date    Bilateral hearing loss 2/4/2021    Hearing aides    DJD (degenerative joint disease), lumbar 6/9/2009    Dysthymia 6/9/2009    Ulcerative colitis ([address] 6/9/2009      Past Surgical History: Procedure Laterality Date    COLONOSCOPY N/A 8/13/2018    COLONOSCOPY performed by Chika Liu MD at [facility] ENDOSCOPY, COLON, DIAGNOSTIC  11/29/11    dr Singh [facility] normal    HX GYN      vaginal birth     Current Outpatient Medications   Medication Sig Dispense Refill    FLUoxetine (PROzac) 10 mg capsule 1 po qod 45 Cap 0    aspirin  mg tablet Take 650 mg by mouth every six (6) hours as needed for Pain.  sulindac (CLINORIL) 200 mg tablet Take 1 Tab by mouth daily as needed for Pain. 30 Tab 2    metroNIDAZOLE (METROCREAM) 0.75 % topical cream as directed.  Cholecalciferol, Vitamin D3, (VITAMIN D3) 1,000 unit cap Take 2,000 Units by mouth.  SF 5000 PLUS 1.1 % crea as directed. No Known Allergies    Family History   Problem Relation Age of Onset    Hypertension Sister      Social History     Tobacco Use    Smoking status: Never Smoker    Smokeless tobacco: Never Used   Substance Use Topics    Alcohol use:  Yes     Alcohol/week: 5.0 standard drinks     Types: 6 Glasses of wine per week

## 2021-02-06 LAB
25(OH)D3 SERPL-MCNC: 54.8 NG/ML (ref 30–100)
ALBUMIN SERPL-MCNC: 3.8 G/DL (ref 3.5–5)
ALBUMIN/GLOB SERPL: 1.3 {RATIO} (ref 1.1–2.2)
ALP SERPL-CCNC: 58 U/L (ref 45–117)
ALT SERPL-CCNC: 17 U/L (ref 12–78)
ANION GAP SERPL CALC-SCNC: 6 MMOL/L (ref 5–15)
AST SERPL-CCNC: 13 U/L (ref 15–37)
BASOPHILS # BLD: 0 K/UL (ref 0–0.1)
BASOPHILS NFR BLD: 1 % (ref 0–1)
BILIRUB SERPL-MCNC: 0.5 MG/DL (ref 0.2–1)
BUN SERPL-MCNC: 11 MG/DL (ref 6–20)
BUN/CREAT SERPL: 16 (ref 12–20)
CALCIUM SERPL-MCNC: 9.4 MG/DL (ref 8.5–10.1)
CHLORIDE SERPL-SCNC: 102 MMOL/L (ref 97–108)
CHOLEST SERPL-MCNC: 188 MG/DL
CO2 SERPL-SCNC: 29 MMOL/L (ref 21–32)
CREAT SERPL-MCNC: 0.67 MG/DL (ref 0.55–1.02)
DIFFERENTIAL METHOD BLD: NORMAL
EOSINOPHIL # BLD: 0.1 K/UL (ref 0–0.4)
EOSINOPHIL NFR BLD: 1 % (ref 0–7)
ERYTHROCYTE [DISTWIDTH] IN BLOOD BY AUTOMATED COUNT: 12.4 % (ref 11.5–14.5)
GLOBULIN SER CALC-MCNC: 3 G/DL (ref 2–4)
GLUCOSE SERPL-MCNC: 94 MG/DL (ref 65–100)
HCT VFR BLD AUTO: 39.4 % (ref 35–47)
HDLC SERPL-MCNC: 74 MG/DL
HDLC SERPL: 2.5 {RATIO} (ref 0–5)
HGB BLD-MCNC: 13 G/DL (ref 11.5–16)
IMM GRANULOCYTES # BLD AUTO: 0 K/UL (ref 0–0.04)
IMM GRANULOCYTES NFR BLD AUTO: 0 % (ref 0–0.5)
LDLC SERPL CALC-MCNC: 105.8 MG/DL (ref 0–100)
LIPID PROFILE,FLP: ABNORMAL
LYMPHOCYTES # BLD: 0.9 K/UL (ref 0.8–3.5)
LYMPHOCYTES NFR BLD: 25 % (ref 12–49)
MCH RBC QN AUTO: 31.3 PG (ref 26–34)
MCHC RBC AUTO-ENTMCNC: 33 G/DL (ref 30–36.5)
MCV RBC AUTO: 94.7 FL (ref 80–99)
MONOCYTES # BLD: 0.3 K/UL (ref 0–1)
MONOCYTES NFR BLD: 8 % (ref 5–13)
NEUTS SEG # BLD: 2.5 K/UL (ref 1.8–8)
NEUTS SEG NFR BLD: 65 % (ref 32–75)
NRBC # BLD: 0 K/UL (ref 0–0.01)
NRBC BLD-RTO: 0 PER 100 WBC
PLATELET # BLD AUTO: 285 K/UL (ref 150–400)
PMV BLD AUTO: 9.5 FL (ref 8.9–12.9)
POTASSIUM SERPL-SCNC: 4.3 MMOL/L (ref 3.5–5.1)
PROT SERPL-MCNC: 6.8 G/DL (ref 6.4–8.2)
RBC # BLD AUTO: 4.16 M/UL (ref 3.8–5.2)
SODIUM SERPL-SCNC: 137 MMOL/L (ref 136–145)
TRIGL SERPL-MCNC: 41 MG/DL (ref ?–150)
TSH SERPL DL<=0.05 MIU/L-ACNC: 1.75 UIU/ML (ref 0.36–3.74)
VLDLC SERPL CALC-MCNC: 8.2 MG/DL
WBC # BLD AUTO: 3.8 K/UL (ref 3.6–11)

## 2021-02-07 ENCOUNTER — PATIENT MESSAGE (OUTPATIENT)
Dept: INTERNAL MEDICINE CLINIC | Age: 71
End: 2021-02-07

## 2021-02-08 NOTE — TELEPHONE ENCOUNTER
From: Ishan Rothman MD  To: Nell Real  Sent: 2/7/2021 11:56 AM EST  Subject: labs    Look great-keep up the good work!   Ishan Rothman MD

## 2021-04-18 DIAGNOSIS — M47.816 SPONDYLOSIS OF LUMBAR REGION WITHOUT MYELOPATHY OR RADICULOPATHY: ICD-10-CM

## 2021-04-19 RX ORDER — SULINDAC 200 MG/1
TABLET ORAL
Qty: 30 TAB | Refills: 0 | Status: SHIPPED | OUTPATIENT
Start: 2021-04-19 | End: 2021-10-15 | Stop reason: SDUPTHER

## 2021-05-25 DIAGNOSIS — F34.1 DYSTHYMIA: ICD-10-CM

## 2021-05-25 RX ORDER — FLUOXETINE 10 MG/1
CAPSULE ORAL
Qty: 45 CAPSULE | Refills: 0 | Status: SHIPPED | OUTPATIENT
Start: 2021-05-25 | End: 2021-07-26

## 2021-07-26 DIAGNOSIS — F34.1 DYSTHYMIA: ICD-10-CM

## 2021-07-26 RX ORDER — FLUOXETINE 10 MG/1
CAPSULE ORAL
Qty: 45 CAPSULE | Refills: 0 | Status: SHIPPED | OUTPATIENT
Start: 2021-07-26 | End: 2021-10-15 | Stop reason: SDUPTHER

## 2021-08-27 ENCOUNTER — TELEPHONE (OUTPATIENT)
Dept: INTERNAL MEDICINE CLINIC | Age: 71
End: 2021-08-27

## 2021-08-27 RX ORDER — METHYLPREDNISOLONE 4 MG/1
TABLET ORAL
Qty: 1 DOSE PACK | Refills: 0 | Status: SHIPPED | OUTPATIENT
Start: 2021-08-27 | End: 2021-09-07 | Stop reason: ALTCHOICE

## 2021-08-27 NOTE — TELEPHONE ENCOUNTER
----- Message from Mark Valentine sent at 8/27/2021  8:33 AM EDT -----  Regarding: Dr. Lum Cogan Message/Vendor Calls    Caller's first and last name: Pt       Reason for call: Has a itchy skin rash all over her body. Needs to speak with nurse about a possible prescription.        Callback required yes/no and why: yes, to discuss       Best contact number(s): [phone] or [phone]      Details to clarify the request: N/A       Mark Valentine

## 2021-08-27 NOTE — TELEPHONE ENCOUNTER
Returned call to patient. She c/o a rash that started on Wed. Rash is predominately on her trunk & some spots on her legs. She denies fevers, chills & muscle pain. She has never had this happen before but notes she has very sensitive skin. No changes in lotions, detergents, & soap but notes last week she used some hotel brand soap in [place]. She has done some yard work but only watering & weeding & says she killed off the poison ivy in her yard a while ago. She has been using some otc cortisone cream which is helping a little but no real improvement. She is requesting an rx to help at least thru the weekend & can follow up next week if necessary. Please advise.

## 2021-08-27 NOTE — TELEPHONE ENCOUNTER
Patient made aware MD approved a medrol dose pack. Discussed directions of use & advised appt next week if the rash does not improve or returns once the steroids are finished. Patient voiced understanding.

## 2021-09-07 ENCOUNTER — OFFICE VISIT (OUTPATIENT)
Dept: INTERNAL MEDICINE CLINIC | Age: 71
End: 2021-09-07
Payer: MEDICARE

## 2021-09-07 VITALS
SYSTOLIC BLOOD PRESSURE: 105 MMHG | HEART RATE: 60 BPM | BODY MASS INDEX: 24.3 KG/M2 | HEIGHT: 65 IN | DIASTOLIC BLOOD PRESSURE: 64 MMHG | OXYGEN SATURATION: 97 % | TEMPERATURE: 98 F | RESPIRATION RATE: 14 BRPM

## 2021-09-07 DIAGNOSIS — L50.9 URTICARIA: Primary | ICD-10-CM

## 2021-09-07 PROCEDURE — G8536 NO DOC ELDER MAL SCRN: HCPCS | Performed by: NURSE PRACTITIONER

## 2021-09-07 PROCEDURE — 3017F COLORECTAL CA SCREEN DOC REV: CPT | Performed by: NURSE PRACTITIONER

## 2021-09-07 PROCEDURE — 99213 OFFICE O/P EST LOW 20 MIN: CPT | Performed by: NURSE PRACTITIONER

## 2021-09-07 PROCEDURE — 1090F PRES/ABSN URINE INCON ASSESS: CPT | Performed by: NURSE PRACTITIONER

## 2021-09-07 PROCEDURE — G8399 PT W/DXA RESULTS DOCUMENT: HCPCS | Performed by: NURSE PRACTITIONER

## 2021-09-07 PROCEDURE — G9717 DOC PT DX DEP/BP F/U NT REQ: HCPCS | Performed by: NURSE PRACTITIONER

## 2021-09-07 PROCEDURE — 1101F PT FALLS ASSESS-DOCD LE1/YR: CPT | Performed by: NURSE PRACTITIONER

## 2021-09-07 PROCEDURE — G8420 CALC BMI NORM PARAMETERS: HCPCS | Performed by: NURSE PRACTITIONER

## 2021-09-07 PROCEDURE — G0463 HOSPITAL OUTPT CLINIC VISIT: HCPCS | Performed by: NURSE PRACTITIONER

## 2021-09-07 PROCEDURE — G8427 DOCREV CUR MEDS BY ELIG CLIN: HCPCS | Performed by: NURSE PRACTITIONER

## 2021-09-07 RX ORDER — FAMOTIDINE 20 MG/1
20 TABLET, FILM COATED ORAL 2 TIMES DAILY
Qty: 28 TABLET | Refills: 0 | Status: SHIPPED | OUTPATIENT
Start: 2021-09-07 | End: 2021-10-15 | Stop reason: ALTCHOICE

## 2021-09-07 RX ORDER — PREDNISONE 20 MG/1
TABLET ORAL
Qty: 18 TABLET | Refills: 0 | Status: SHIPPED | OUTPATIENT
Start: 2021-09-07 | End: 2021-10-15 | Stop reason: ALTCHOICE

## 2021-09-07 RX ORDER — CETIRIZINE HCL 10 MG
10 TABLET ORAL DAILY
Qty: 14 TABLET | Refills: 0 | Status: SHIPPED | OUTPATIENT
Start: 2021-09-07

## 2021-09-07 NOTE — PROGRESS NOTES
Salima Man (: 1950) is a 70 y.o. female, established patient, here for evaluation of the following chief complaint(s):  Rash (started on 2021 - all over her body - got medication helped for reduce and went away for a day then came back the next day worse )       ASSESSMENT/PLAN:  Below is the assessment and plan developed based on review of pertinent history, physical exam, labs, studies, and medications. 1. Urticaria -- trigger unknown but she is clearly having a significant bout of hives. -     cetirizine (ZYRTEC) 10 mg tablet; Take 1 Tablet by mouth daily. , Normal, Disp-14 Tablet, R-0  -     famotidine (PEPCID) 20 mg tablet; Take 1 Tablet by mouth two (2) times a day., Normal, Disp-28 Tablet, R-0  -     predniSONE (DELTASONE) 20 mg tablet; Take 3 tabs daily for 3 days then 2 tabs daily x 3 days then 1 tab daily x 2 days and 1/2 tab daily x2 days, Normal, Disp-18 Tablet, R-0        SUBJECTIVE/OBJECTIVE:  HPI    Patient of Dr Anika Koch who presents with complaints of itchy rash on arms, legs and torso that began on . She had been traveling to [facility] the previous week but does not recall eating any unusual foods or taking any new medications. Broke out with extensive hives on anterior/posterior torso, bilateral arms and legs. Face was spared. She contacted office on  at which time a Medrol dose pack was prescribed and she completed this. Reports hives resolved while on Medrol but have returned since she completed course on . Has pictures on her phone from the past several days. Denies oral edema, difficulty swallowing, shortness of breath or wheezing. Has not been taking any OTC antihistamines.     Patient Active Problem List   Diagnosis Code    DJD (degenerative joint disease), lumbar M47.816    Dysthymia F34.1    Ulcerative colitis ([facility] [address] K51.90    Menopause Z78.0    Bilateral hearing loss H91.93     Past Surgical History:   Procedure Laterality Date    COLONOSCOPY N/A 8/13/2018    COLONOSCOPY performed by Jose Maria Hernandez MD at [facility] [address], COLON, DIAGNOSTIC  11/29/11    dr Hook Agudelo normal    HX GYN      vaginal birth     Social History     Socioeconomic History    Marital status:      Spouse name: Not on file    Number of children: Not on file    Years of education: Not on file    Highest education level: Not on file   Occupational History    Not on file   Tobacco Use    Smoking status: Never Smoker    Smokeless tobacco: Never Used   Vaping Use    Vaping Use: Never used   Substance and Sexual Activity    Alcohol use: Yes     Alcohol/week: 5.0 standard drinks     Types: 6 Glasses of wine per week    Drug use: No    Sexual activity: Not Currently   Other Topics Concern    Not on file   Social History Narrative    Not on file     Social Determinants of Health     Financial Resource Strain:     Difficulty of Paying Living Expenses:    Food Insecurity:     Worried About Running Out of Food in the Last Year:     Ran Out of Food in the Last Year:    Transportation Needs:     Lack of Transportation (Medical):  Lack of Transportation (Non-Medical):    Physical Activity:     Days of Exercise per Week:     Minutes of Exercise per Session:    Stress:     Feeling of Stress :    Social Connections:     Frequency of Communication with Friends and Family:     Frequency of Social Gatherings with Friends and Family:     Attends [employer] Services:     Active Member of Clubs or Organizations:     Attends Club or Organization Meetings:     Marital Status:    Intimate Partner Violence:     Fear of Current or Ex-Partner:     Emotionally Abused:     Physically Abused:     Sexually Abused:      Family History   Problem Relation Age of Onset    Hypertension Sister      Current Outpatient Medications   Medication Sig    cetirizine (ZYRTEC) 10 mg tablet Take 1 Tablet by mouth daily.     famotidine (PEPCID) 20 mg tablet Take 1 Tablet by mouth two (2) times a day.  predniSONE (DELTASONE) 20 mg tablet Take 3 tabs daily for 3 days then 2 tabs daily x 3 days then 1 tab daily x 2 days and 1/2 tab daily x2 days    FLUoxetine (PROzac) 10 mg capsule TAKE ONE CAPSULE BY MOUTH EVERY OTHER DAY     sulindac (CLINORIL) 200 mg tablet TAKE ONE TABLET BY MOUTH ONE TIME DAILY AS NEEDED FOR PAIN     aspirin  mg tablet Take 650 mg by mouth every six (6) hours as needed for Pain.  metroNIDAZOLE (METROCREAM) 0.75 % topical cream as directed.  SF 5000 PLUS 1.1 % crea as directed.  Cholecalciferol, Vitamin D3, (VITAMIN D3) 1,000 unit cap Take 2,000 Units by mouth. No current facility-administered medications for this visit. No Known Allergies  Immunization History   Administered Date(s) Administered    COVID-19, PFIZER, MRNA, LNP-S, PF, 30MCG/0.3ML DOSE 01/27/2021    Influenza High Dose Vaccine PF 01/03/2018    Influenza Vaccine 09/09/2020    Pneumococcal Conjugate (PCV-13) 04/18/2018    Pneumococcal Polysaccharide (PPSV-23) 12/21/2015    TDAP Vaccine 09/08/2009    Zoster Vaccine, Live 08/05/2013       Review of Systems   Constitutional: Positive for fatigue. Negative for chills and fever. HENT: Negative for postnasal drip and sore throat. Respiratory: Negative for cough and shortness of breath. Cardiovascular: Negative for chest pain. Skin: Positive for rash. Neurological: Negative for light-headedness, numbness and headaches. /64 (BP 1 Location: Left upper arm, BP Patient Position: Sitting, BP Cuff Size: Adult)   Pulse 60   Temp 98 °F (36.7 °C) (Oral)   Resp 14   Ht 5' 5\" (1.651 m)   LMP 04/23/2003   SpO2 97%   BMI 24.30 kg/m²   Physical Exam  Vitals and nursing note reviewed. Constitutional:       General: She is not in acute distress. Appearance: Normal appearance. HENT:      Head: Normocephalic and atraumatic.       Nose: Nose normal.      Mouth/Throat:      Mouth: Mucous membranes are moist.      Pharynx: Oropharynx is clear. Cardiovascular:      Rate and Rhythm: Normal rate and regular rhythm. Pulmonary:      Effort: Pulmonary effort is normal.      Breath sounds: Normal breath sounds. No wheezing or rhonchi. Musculoskeletal:      Cervical back: Normal range of motion and neck supple. Skin:     Findings: Rash present. Rash is urticarial.             Comments: Isolated areas of urticarial lesions. Had extensive hives covering right leg from groin to mid calf yesterday as per pics on phone   Neurological:      General: No focal deficit present. Mental Status: She is alert and oriented to person, place, and time. Psychiatric:         Mood and Affect: Mood normal.         Behavior: Behavior normal.               An electronic signature was used to authenticate this note.   -- Candice Wu NP

## 2021-09-07 NOTE — PATIENT INSTRUCTIONS
Hives: Care Instructions  Your Care Instructions  Hives are raised, red, itchy patches of skin. They are also called wheals or welts. They usually have red borders and pale centers. Hives range in size from ¼ inch to 3 inches or more across. They may seem to move from place to place on the skin. Several hives may form a large area of raised, red skin. You can get hives after an insect sting, after taking medicine or eating certain foods, or because of infection or stress. Other causes include plants, things you breathe in, makeup, heat, cold, sunlight, and latex. You cannot spread hives to other people. Hives may last a few minutes or a few days, but a single spot may last less than 36 hours. Follow-up care is a key part of your treatment and safety. Be sure to make and go to all appointments, and call your doctor if you are having problems. It's also a good idea to know your test results and keep a list of the medicines you take. How can you care for yourself at home? · Avoid whatever you think may have caused your hives, such as a certain food or medicine. However, you may not know the cause. · Put a cool, wet towel on the area to relieve itching. · Take an over-the-counter antihistamine, such as diphenhydramine (Benadryl), cetirizine (Zyrtec), or loratadine (Claritin), to help stop the hives and calm the itching. Read and follow directions on the label. These medicines can make you feel sleepy. Do not drive while using them. · Stay away from strong soaps, detergents, and chemicals. These can make itching worse. When should you call for help? Call 911 anytime you think you may need emergency care. For example, call if:    · You have symptoms of a severe allergic reaction. These may include:  ? Sudden raised, red areas (hives) all over your body. ? Swelling of the throat, mouth, lips, or tongue. ? Trouble breathing. ? Passing out (losing consciousness).  Or you may feel very lightheaded or suddenly feel weak, confused, or restless. Call your doctor now or seek immediate medical care if:    · You have symptoms of an allergic reaction, such as:  ? A rash or hives (raised, red areas on the skin). ? Itching. ? Swelling. ? Belly pain, nausea, or vomiting.     · You get hives after you start a new medicine.     · Hives have not gone away after 24 hours. Watch closely for changes in your health, and be sure to contact your doctor if:    · You do not get better as expected. Where can you learn more? Go to http://www.gray.com/  Enter [id number] in the search box to learn more about \"Hives: Care Instructions. \"  Current as of: February 26, 2020               Content Version: 12.8  © 2006-2021 [employer]. Care instructions adapted under license by [employer] (which disclaims liability or warranty for this information). If you have questions about a medical condition or this instruction, always ask your healthcare professional. [address] any warranty or liability for your use of this information.

## 2021-10-15 ENCOUNTER — VIRTUAL VISIT (OUTPATIENT)
Dept: INTERNAL MEDICINE CLINIC | Age: 71
End: 2021-10-15
Payer: MEDICARE

## 2021-10-15 DIAGNOSIS — K51.90 ULCERATIVE COLITIS WITHOUT COMPLICATIONS, UNSPECIFIED LOCATION (HCC): ICD-10-CM

## 2021-10-15 DIAGNOSIS — L50.9 URTICARIA: ICD-10-CM

## 2021-10-15 DIAGNOSIS — F34.1 DYSTHYMIA: Primary | ICD-10-CM

## 2021-10-15 DIAGNOSIS — M47.816 SPONDYLOSIS OF LUMBAR REGION WITHOUT MYELOPATHY OR RADICULOPATHY: ICD-10-CM

## 2021-10-15 PROCEDURE — 3017F COLORECTAL CA SCREEN DOC REV: CPT | Performed by: INTERNAL MEDICINE

## 2021-10-15 PROCEDURE — G8427 DOCREV CUR MEDS BY ELIG CLIN: HCPCS | Performed by: INTERNAL MEDICINE

## 2021-10-15 PROCEDURE — G8536 NO DOC ELDER MAL SCRN: HCPCS | Performed by: INTERNAL MEDICINE

## 2021-10-15 PROCEDURE — G9717 DOC PT DX DEP/BP F/U NT REQ: HCPCS | Performed by: INTERNAL MEDICINE

## 2021-10-15 PROCEDURE — G8420 CALC BMI NORM PARAMETERS: HCPCS | Performed by: INTERNAL MEDICINE

## 2021-10-15 PROCEDURE — G8399 PT W/DXA RESULTS DOCUMENT: HCPCS | Performed by: INTERNAL MEDICINE

## 2021-10-15 PROCEDURE — 99214 OFFICE O/P EST MOD 30 MIN: CPT | Performed by: INTERNAL MEDICINE

## 2021-10-15 PROCEDURE — 1101F PT FALLS ASSESS-DOCD LE1/YR: CPT | Performed by: INTERNAL MEDICINE

## 2021-10-15 PROCEDURE — G0463 HOSPITAL OUTPT CLINIC VISIT: HCPCS | Performed by: INTERNAL MEDICINE

## 2021-10-15 PROCEDURE — 1090F PRES/ABSN URINE INCON ASSESS: CPT | Performed by: INTERNAL MEDICINE

## 2021-10-15 RX ORDER — SULINDAC 200 MG/1
TABLET ORAL
Qty: 30 TABLET | Refills: 2 | Status: SHIPPED | OUTPATIENT
Start: 2021-10-15 | End: 2022-05-11

## 2021-10-15 RX ORDER — FLUOXETINE 10 MG/1
CAPSULE ORAL
Qty: 45 CAPSULE | Refills: 2 | Status: SHIPPED | OUTPATIENT
Start: 2021-10-15 | End: 2022-04-14 | Stop reason: SDUPTHER

## 2021-10-15 NOTE — PROGRESS NOTES
Consent: Yomi River, who was seen by synchronous (real-time) audio-video technology, and/or her healthcare decision maker, is aware that this patient-initiated, Telehealth encounter on 10/15/2021 is a billable service, with coverage as determined by her insurance carrier. She is aware that she may receive a bill and has provided verbal consent to proceed: YES  712  Subjective:   Yomi River is a 70 y.o. female who was seen for Medication Evaluation      1.hives-resolved with steroids but occas rash at waist line-advised stay on zyrtec for prevention  Not currently using this  2.mood-stable on prozac 10 every other day-tried off and did not feel well  3. djd-rare use of clinoril advised not to combine with asa  4.us-no recent flares  Going to beach this weekend    Current Outpatient Medications   Medication Sig    FLUoxetine (PROzac) 10 mg capsule 1 po qod    sulindac (CLINORIL) 200 mg tablet TAKE ONE TABLET BY MOUTH ONE TIME DAILY AS NEEDED FOR PAIN    cetirizine (ZYRTEC) 10 mg tablet Take 1 Tablet by mouth daily.  metroNIDAZOLE (METROCREAM) 0.75 % topical cream as directed.  SF 5000 PLUS 1.1 % crea as directed.  Cholecalciferol, Vitamin D3, (VITAMIN D3) 1,000 unit cap Take 2,000 Units by mouth. No current facility-administered medications for this visit.        No Known Allergies    Past Medical History:   Diagnosis Date    Bilateral hearing loss 2/4/2021    Hearing aides    DJD (degenerative joint disease), lumbar 6/9/2009    Dysthymia 6/9/2009    Ulcerative colitis ([facility] [address] 6/9/2009       ROS  All other systems reviewed and negative, unless mentioned in HPI    Objective:   Vital Signs: (As obtained by patient/caregiver at home)  Visit Vitals  LMP 04/23/2003        [INSTRUCTIONS:  \"[x]\" Indicates a positive item  \"[]\" Indicates a negative item  -- DELETE ALL ITEMS NOT EXAMINED]    Constitutional: [x] Appears well-developed and well-nourished [x] No apparent distress      [] Abnormal - Mental status: [x] Alert and awake  [x] Oriented to person/place/time [x] Able to follow commands    [] Abnormal -     Eyes:   EOM    [x]  Normal    [] Abnormal -   Sclera  [x]  Normal    [] Abnormal -          Discharge [x]  None visible   [] Abnormal -     HENT: [x] Normocephalic, atraumatic  [] Abnormal -       External Ears [x] Normal  [] Abnormal -    Neck: [x] No visualized mass [] Abnormal -     Pulmonary/Chest: [x] Respiratory effort normal   [x] No visualized signs of difficulty breathing or respiratory distress        [] Abnormal -      Musculoskeletal:            [x] Normal range of motion of neck        [] Abnormal -     Neurological:        [x] No Facial Asymmetry (Cranial nerve 7 motor function) (limited exam due to video visit)          [x] No gaze palsy        [] Abnormal -          Skin:        [x] No significant exanthematous lesions or discoloration noted on facial skin         [] Abnormal -            Psychiatric:       [x] Normal Affect [] Abnormal -           Other pertinent observable physical exam findings:-        Assessment & Plan:   Diagnoses and all orders for this visit:    1. Dysthymia  -     FLUoxetine (PROzac) 10 mg capsule; 1 po qod    2. Urticaria  Advised suppressive zyrtec 10 mg  3. Ulcerative colitis without complications, unspecified location (HCC)  No flares  4. Spondylosis of lumbar region without myelopathy or radiculopathy  -     sulindac (CLINORIL) 200 mg tablet; TAKE ONE TABLET BY MOUTH ONE TIME DAILY AS NEEDED FOR PAIN            We discussed the expected course, resolution and complications of the diagnosis(es) in detail. Medication risks, benefits, costs, interactions, and alternatives were discussed as indicated. I advised her to contact the office if her condition worsens, changes or fails to improve as anticipated. She expressed understanding with the diagnosis(es) and plan.      Todd Salsa is a 70 y.o. female being evaluated by a video visit encounter for concerns as above. A caregiver was present when appropriate. Due to this being a TeleHealth encounter (During [facility] public health emergency), evaluation of the following organ systems was limited: Vitals/Constitutional/EENT/Resp/CV/GI//MS/Neuro/Skin/Heme-Lymph-Imm. Pursuant to the emergency declaration under the [facility], [facility]5 waiver authority and the [employer] and [employer] Act, this Virtual  Visit was conducted, with patient's (and/or legal guardian's) consent, to reduce the patient's risk of exposure to COVID-19 and provide necessary medical care. Services were provided through a video synchronous discussion virtually to substitute for in-person clinic visit. Patient and provider were located at their individual homes.

## 2021-11-30 ENCOUNTER — PATIENT MESSAGE (OUTPATIENT)
Dept: INTERNAL MEDICINE CLINIC | Age: 71
End: 2021-11-30

## 2021-11-30 NOTE — TELEPHONE ENCOUNTER
From: Juanita Alcocer  To: Sanjiv Mas MD  Sent: 11/30/2021 10:57 AM EST  Subject: Hilma Dance Dr. Merrilee Myrtle, I just got my second shingles vaccine and wanted to get this on my record. Shingrix 50 MCG on Sunday , Nov 28th. At [facility] they mentioned the prior vaccine I received was probably different than this and that perhaps I may need a second dose of this in 2 to 6 months. They said check with your Doctor!!!! So here is the information!   Take Care, Juanita Alcocer

## 2022-03-19 PROBLEM — H91.93 BILATERAL HEARING LOSS: Status: ACTIVE | Noted: 2021-02-04

## 2022-04-04 ENCOUNTER — OFFICE VISIT (OUTPATIENT)
Dept: INTERNAL MEDICINE CLINIC | Age: 72
End: 2022-04-04
Payer: MEDICARE

## 2022-04-04 VITALS
BODY MASS INDEX: 26.62 KG/M2 | HEART RATE: 55 BPM | WEIGHT: 159.8 LBS | OXYGEN SATURATION: 97 % | DIASTOLIC BLOOD PRESSURE: 63 MMHG | RESPIRATION RATE: 14 BRPM | HEIGHT: 65 IN | SYSTOLIC BLOOD PRESSURE: 136 MMHG | TEMPERATURE: 97.9 F

## 2022-04-04 DIAGNOSIS — M62.830 LUMBAR PARASPINAL MUSCLE SPASM: ICD-10-CM

## 2022-04-04 DIAGNOSIS — R10.9 FLANK PAIN: Primary | ICD-10-CM

## 2022-04-04 DIAGNOSIS — R35.0 URINARY FREQUENCY: ICD-10-CM

## 2022-04-04 DIAGNOSIS — K59.00 CONSTIPATION, UNSPECIFIED CONSTIPATION TYPE: ICD-10-CM

## 2022-04-04 LAB
BILIRUB UR QL STRIP: NEGATIVE
GLUCOSE UR-MCNC: NEGATIVE MG/DL
KETONES P FAST UR STRIP-MCNC: NEGATIVE MG/DL
PH UR STRIP: 7 [PH] (ref 4.6–8)
PROT UR QL STRIP: NEGATIVE
SP GR UR STRIP: 1.02 (ref 1–1.03)
UA UROBILINOGEN AMB POC: NORMAL (ref 0.2–1)
URINALYSIS CLARITY POC: CLEAR
URINALYSIS COLOR POC: YELLOW
URINE BLOOD POC: NEGATIVE
URINE LEUKOCYTES POC: NORMAL
URINE NITRITES POC: NEGATIVE

## 2022-04-04 PROCEDURE — G8419 CALC BMI OUT NRM PARAM NOF/U: HCPCS | Performed by: NURSE PRACTITIONER

## 2022-04-04 PROCEDURE — G8536 NO DOC ELDER MAL SCRN: HCPCS | Performed by: NURSE PRACTITIONER

## 2022-04-04 PROCEDURE — 99214 OFFICE O/P EST MOD 30 MIN: CPT | Performed by: NURSE PRACTITIONER

## 2022-04-04 PROCEDURE — 1101F PT FALLS ASSESS-DOCD LE1/YR: CPT | Performed by: NURSE PRACTITIONER

## 2022-04-04 PROCEDURE — G8399 PT W/DXA RESULTS DOCUMENT: HCPCS | Performed by: NURSE PRACTITIONER

## 2022-04-04 PROCEDURE — G9717 DOC PT DX DEP/BP F/U NT REQ: HCPCS | Performed by: NURSE PRACTITIONER

## 2022-04-04 PROCEDURE — 3017F COLORECTAL CA SCREEN DOC REV: CPT | Performed by: NURSE PRACTITIONER

## 2022-04-04 PROCEDURE — G0463 HOSPITAL OUTPT CLINIC VISIT: HCPCS | Performed by: NURSE PRACTITIONER

## 2022-04-04 PROCEDURE — 1090F PRES/ABSN URINE INCON ASSESS: CPT | Performed by: NURSE PRACTITIONER

## 2022-04-04 PROCEDURE — G8427 DOCREV CUR MEDS BY ELIG CLIN: HCPCS | Performed by: NURSE PRACTITIONER

## 2022-04-04 RX ORDER — POLYETHYLENE GLYCOL 3350 17 G/17G
17 POWDER, FOR SOLUTION ORAL
Qty: 289 G | Refills: 0
Start: 2022-04-04

## 2022-04-04 RX ORDER — BACLOFEN 10 MG/1
10 TABLET ORAL
Qty: 30 TABLET | Refills: 0 | Status: SHIPPED | OUTPATIENT
Start: 2022-04-04 | End: 2022-09-20 | Stop reason: ALTCHOICE

## 2022-04-04 NOTE — PATIENT INSTRUCTIONS
Flank Pain: Care Instructions  Your Care Instructions  Flank pain is pain on the side of the back just below the rib cage and above the waist. It can be on one or both sides. Flank pain has many possible causes, including a kidney stone, a urinary tract infection, or back strain. Flank pain may get better on its own. But don't ignore new symptoms, such as fever, nausea and vomiting, urination problems, pain that gets worse, and dizziness. These may be signs of a more serious problem. You may have to have tests or other treatment. Follow-up care is a key part of your treatment and safety. Be sure to make and go to all appointments, and call your doctor if you are having problems. It's also a good idea to know your test results and keep a list of the medicines you take. How can you care for yourself at home? · Rest until you feel better. · Take pain medicines exactly as directed. ? If the doctor gave you a prescription medicine for pain, take it as prescribed. ? If you are not taking a prescription pain medicine, ask your doctor if you can take an over-the-counter pain medicine, such as acetaminophen (Tylenol), ibuprofen (Advil, Motrin), or naproxen (Aleve). Read and follow all instructions on the label. · If your doctor prescribed antibiotics, take them as directed. Do not stop taking them just because you feel better. You need to take the full course of antibiotics. · To apply heat, put a warm water bottle, a heating pad set on low, or a warm cloth on the painful area. Do not go to sleep with a heating pad on your skin. · To prevent dehydration, drink plenty of fluids. Choose water and other clear liquids until you feel better. If you have kidney, heart, or liver disease and have to limit fluids, talk with your doctor before you increase the amount of fluids you drink. When should you call for help?    Call your doctor now or seek immediate medical care if:    · Your flank pain gets worse.     · You have new symptoms, such as fever, nausea, or vomiting.     · You have symptoms of a urinary problem. For example:  ? You have blood or pus in your urine. ? You have chills or body aches. ? It hurts to urinate. ? You have groin or belly pain. Watch closely for changes in your health, and be sure to contact your doctor if you do not get better as expected. Where can you learn more? Go to http://www.cortez.com/  Enter S191 in the search box to learn more about \"Flank Pain: Care Instructions. \"  Current as of: July 1, 2021               Content Version: 13.2  © 6725-0871 [employer]. Care instructions adapted under license by [employer] (which disclaims liability or warranty for this information). If you have questions about a medical condition or this instruction, always ask your healthcare professional. [place] [address] any warranty or liability for your use of this information. Back Stretches: Exercises  Introduction  Here are some examples of exercises for stretching your back. Start each exercise slowly. Ease off the exercise if you start to have pain. Your doctor or physical therapist will tell you when you can start these exercises and which ones will work best for you. How to do the exercises  Overhead stretch    1. Stand comfortably with your feet shoulder-width apart. 2. Looking straight ahead, raise both arms over your head and reach toward the ceiling. Do not allow your head to tilt back. 3. Hold for 15 to 30 seconds, then lower your arms to your sides. 4. Repeat 2 to 4 times. Side stretch    1. Stand comfortably with your feet shoulder-width apart. 2. Raise one arm over your head, and then lean to the other side. 3. Slide your hand down your leg as you let the weight of your arm gently stretch your side muscles. Hold for 15 to 30 seconds. 4. Repeat 2 to 4 times on each side. Press-up    1.  Lie on your stomach, supporting your body with your forearms. 2. Press your elbows down into the floor to raise your upper back. As you do this, relax your stomach muscles and allow your back to arch without using your back muscles. As your press up, do not let your hips or pelvis come off the floor. 3. Hold for 15 to 30 seconds, then relax. 4. Repeat 2 to 4 times. Relax and rest    1. Lie on your back with a rolled towel under your neck and a pillow under your knees. Extend your arms comfortably to your sides. 2. Relax and breathe normally. 3. Remain in this position for about 10 minutes. 4. If you can, do this 2 or 3 times each day. Follow-up care is a key part of your treatment and safety. Be sure to make and go to all appointments, and call your doctor if you are having problems. It's also a good idea to know your test results and keep a list of the medicines you take. Where can you learn more? Go to http://www.cortez.com/  Enter Y090 in the search box to learn more about \"Back Stretches: Exercises. \"  Current as of: July 1, 2021               Content Version: 13.2  © 2006-2022 [employer]. Care instructions adapted under license by [employer] (which disclaims liability or warranty for this information). If you have questions about a medical condition or this instruction, always ask your healthcare professional. [place] [address] any warranty or liability for your use of this information.

## 2022-04-04 NOTE — PROGRESS NOTES
Armando Joseph (: 1950) is a 70 y.o. female, established patient, here for evaluation of the following chief complaint(s):  Flank Pain (low back and side pain for a few days, )       ASSESSMENT/PLAN:  Below is the assessment and plan developed based on review of pertinent history, physical exam, labs, studies, and medications. 1. Flank pain  -     AMB POC URINALYSIS DIP STICK AUTO W/ MICRO    2. Urinary frequency -- she has been taking Amoxil since last night with some improvement of symptoms; continue with antibiotics until urine culture returns  -     CULTURE, URINE; Future    3. Lumbar paraspinal muscle spasm -- has Sulindac supply at home; will have her take 1 tab daily with food for the next 4-5 days; stretching exercises given. -     baclofen (LIORESAL) 10 mg tablet; Take 1 Tablet by mouth three (3) times daily as needed for Muscle Spasm(s). , Normal, Disp-30 Tablet, R-0    4. Constipation, unspecified constipation type  -     polyethylene glycol (MIRALAX) 17 gram/dose powder; Take 17 g by mouth daily as needed for Constipation. , No Print, Disp-289 g, R-0        SUBJECTIVE/OBJECTIVE:  HPI    Patient of Dr. Alyce Savage who presents with complaints of lower back pain for the past several days. Reports pain has been stabbing and radiating up and down spine but does not radiate into right leg. Reports she has been doing yard work with [employer] recently. Has used heating pad and ibuprofen with temporary improvement. Has also noted some urinary frequency but denies blood in urine, burning sensation. Denies history of kidney stones. Reports pain in right lower back intensified last night and she took an amoxicillin 875 mg tablet that she had leftover from dental work. Felt like pain subsided after she took antibiotic. Denies fever, chills, nausea, vomiting. Has not noted any rash on lower back. Reports she has been slightly constipated over the past week.     Patient Active Problem List Diagnosis Code    DJD (degenerative joint disease), lumbar M47.816    Dysthymia F34.1    [facility] K51.90    Menopause Z78.0    Bilateral hearing loss H91.93     Past Surgical History:   Procedure Laterality Date    COLONOSCOPY N/A 8/13/2018    COLONOSCOPY performed by Luis Oliver MD at [facility] ENDOSCOPY, COLON, DIAGNOSTIC  11/29/11    dr Rosalind Sneed normal    HX GYN      vaginal birth     Social History     Socioeconomic History    Marital status:      Spouse name: Not on file    Number of children: Not on file    Years of education: Not on file    Highest education level: Not on file   Occupational History    Not on file   Tobacco Use    Smoking status: Never Smoker    Smokeless tobacco: Never Used   Vaping Use    Vaping Use: Never used   Substance and Sexual Activity    Alcohol use: Yes     Alcohol/week: 5.0 standard drinks     Types: 6 Glasses of wine per week    Drug use: No    Sexual activity: Not Currently   Other Topics Concern    Not on file   Social History Narrative    Not on file     Social Determinants of Health     Financial Resource Strain:     Difficulty of Paying Living Expenses: Not on file   Food Insecurity:     Worried About Running Out of Food in the Last Year: Not on file    Lazaro of Food in the Last Year: Not on file   Transportation Needs:     Lack of Transportation (Medical): Not on file    Lack of Transportation (Non-Medical):  Not on file   Physical Activity:     Days of Exercise per Week: Not on file    Minutes of Exercise per Session: Not on file   Stress:     Feeling of Stress : Not on file   Social Connections:     Frequency of Communication with Friends and Family: Not on file    Frequency of Social Gatherings with Friends and Family: Not on file    Attends Restoration Services: Not on file    Active Member of Clubs or Organizations: Not on file    Attends Club or Organization Meetings: Not on file    Marital Status: Not on file   Intimate Partner Violence:     Fear of Current or Ex-Partner: Not on file    Emotionally Abused: Not on file    Physically Abused: Not on file    Sexually Abused: Not on file   Housing Stability:     Unable to Pay for Housing in the Last Year: Not on file    Number of [place] in the Last Year: Not on file    Unstable Housing in the Last Year: Not on file     Family History   Problem Relation Age of Onset    Hypertension Sister      Current Outpatient Medications   Medication Sig    baclofen (LIORESAL) 10 mg tablet Take 1 Tablet by mouth three (3) times daily as needed for Muscle Spasm(s).  polyethylene glycol (MIRALAX) 17 gram/dose powder Take 17 g by mouth daily as needed for Constipation.  FLUoxetine (PROzac) 10 mg capsule 1 po qod    sulindac (CLINORIL) 200 mg tablet TAKE ONE TABLET BY MOUTH ONE TIME DAILY AS NEEDED FOR PAIN    cetirizine (ZYRTEC) 10 mg tablet Take 1 Tablet by mouth daily.  metroNIDAZOLE (METROCREAM) 0.75 % topical cream as directed.  SF 5000 PLUS 1.1 % crea as directed.  Cholecalciferol, Vitamin D3, (VITAMIN D3) 1,000 unit cap Take 2,000 Units by mouth. No current facility-administered medications for this visit. No Known Allergies  Immunization History   Administered Date(s) Administered    COVID-19, Pfizer Purple top, DILUTE for use, 12+ yrs, 30mcg/0.3mL dose 01/27/2021, 02/17/2021, 10/04/2021    Influenza High Dose Vaccine PF 01/03/2018, 10/04/2021    Influenza Vaccine 09/09/2020    Pneumococcal Conjugate (PCV-13) 04/18/2018    Pneumococcal Polysaccharide (PPSV-23) 12/21/2015    TDAP Vaccine 09/08/2009    Zoster Recombinant 11/28/2021    Zoster Vaccine, Live 08/05/2013       Review of Systems   Constitutional: Positive for fatigue. Negative for chills and fever. HENT: Negative for congestion, sinus pain and sore throat. Respiratory: Negative for cough and shortness of breath. Cardiovascular: Negative for chest pain. Gastrointestinal: Positive for constipation. Negative for abdominal pain and blood in stool. Genitourinary: Positive for frequency. Negative for dysuria, flank pain and urgency. Musculoskeletal: Positive for back pain. Skin: Negative for rash. Neurological: Negative for numbness and headaches. /63 (BP 1 Location: Left upper arm, BP Patient Position: Sitting, BP Cuff Size: Adult)   Pulse (!) 55   Temp 97.9 °F (36.6 °C) (Oral)   Resp 14   Ht 5' 5\" (1.651 m)   Wt 159 lb 12.8 oz (72.5 kg)   LMP 04/23/2003   SpO2 97%   BMI 26.59 kg/m²   Physical Exam  Vitals and nursing note reviewed. Constitutional:       General: She is not in acute distress. Appearance: Normal appearance. HENT:      Head: Normocephalic and atraumatic. Cardiovascular:      Rate and Rhythm: Normal rate and regular rhythm. Pulmonary:      Effort: Pulmonary effort is normal.      Breath sounds: Normal breath sounds. No wheezing or rhonchi. Abdominal:      General: Bowel sounds are normal.      Palpations: Abdomen is soft. Tenderness: There is no abdominal tenderness. There is no right CVA tenderness or left CVA tenderness. Musculoskeletal:      Cervical back: Normal range of motion and neck supple. Lumbar back: Spasms and tenderness present. Decreased range of motion. Back:    Skin:     General: Skin is warm and dry. Findings: No rash. Neurological:      General: No focal deficit present. Mental Status: She is alert and oriented to person, place, and time.        Results for orders placed or performed in visit on 04/04/22   AMB POC URINALYSIS DIP STICK AUTO W/ MICRO     Status: None   Result Value Ref Range Status    Color (UA POC) Yellow  Final    Clarity (UA POC) Clear  Final    Glucose (UA POC) Negative Negative Final    Bilirubin (UA POC) Negative Negative Final    Ketones (UA POC) Negative Negative Final    Specific gravity (UA POC) 1.025 1.001 - 1.035 Final    Blood (UA POC) Negative Negative Final    pH (UA POC) 7.0 4.6 - 8.0 Final    Protein (UA POC) Negative Negative Final    Urobilinogen (UA POC) 0.2 mg/dL 0.2 - 1 Final    Nitrites (UA POC) Negative Negative Final    Leukocyte esterase (UA POC) Trace Negative Final         On this date 04/04/2022 I have spent 30 minutes reviewing previous notes, test results and face to face with the patient discussing the diagnosis and importance of compliance with the treatment plan as well as documenting on the day of the visit. An electronic signature was used to authenticate this note.   -- Sondra Dooley, NP

## 2022-04-06 LAB
BACTERIA SPEC CULT: ABNORMAL
CC UR VC: ABNORMAL
SERVICE CMNT-IMP: ABNORMAL

## 2022-04-14 ENCOUNTER — HOSPITAL ENCOUNTER (OUTPATIENT)
Dept: GENERAL RADIOLOGY | Age: 72
Discharge: HOME | End: 2022-04-14
Payer: MEDICARE

## 2022-04-14 ENCOUNTER — OFFICE VISIT (OUTPATIENT)
Dept: INTERNAL MEDICINE CLINIC | Age: 72
End: 2022-04-14
Payer: MEDICARE

## 2022-04-14 VITALS
BODY MASS INDEX: 26.42 KG/M2 | HEART RATE: 62 BPM | SYSTOLIC BLOOD PRESSURE: 110 MMHG | RESPIRATION RATE: 16 BRPM | OXYGEN SATURATION: 95 % | DIASTOLIC BLOOD PRESSURE: 60 MMHG | WEIGHT: 158.6 LBS | HEIGHT: 65 IN

## 2022-04-14 DIAGNOSIS — M54.50 LOW BACK PAIN AT MULTIPLE SITES: ICD-10-CM

## 2022-04-14 DIAGNOSIS — K51.90 ULCERATIVE COLITIS WITHOUT COMPLICATIONS, UNSPECIFIED LOCATION (HCC): ICD-10-CM

## 2022-04-14 DIAGNOSIS — F34.1 DYSTHYMIA: Primary | ICD-10-CM

## 2022-04-14 PROCEDURE — G8399 PT W/DXA RESULTS DOCUMENT: HCPCS | Performed by: INTERNAL MEDICINE

## 2022-04-14 PROCEDURE — 72100 X-RAY EXAM L-S SPINE 2/3 VWS: CPT

## 2022-04-14 PROCEDURE — 1090F PRES/ABSN URINE INCON ASSESS: CPT | Performed by: INTERNAL MEDICINE

## 2022-04-14 PROCEDURE — G0463 HOSPITAL OUTPT CLINIC VISIT: HCPCS | Performed by: INTERNAL MEDICINE

## 2022-04-14 PROCEDURE — 99214 OFFICE O/P EST MOD 30 MIN: CPT | Performed by: INTERNAL MEDICINE

## 2022-04-14 PROCEDURE — G9717 DOC PT DX DEP/BP F/U NT REQ: HCPCS | Performed by: INTERNAL MEDICINE

## 2022-04-14 PROCEDURE — G8419 CALC BMI OUT NRM PARAM NOF/U: HCPCS | Performed by: INTERNAL MEDICINE

## 2022-04-14 PROCEDURE — G8427 DOCREV CUR MEDS BY ELIG CLIN: HCPCS | Performed by: INTERNAL MEDICINE

## 2022-04-14 PROCEDURE — 1101F PT FALLS ASSESS-DOCD LE1/YR: CPT | Performed by: INTERNAL MEDICINE

## 2022-04-14 PROCEDURE — G8536 NO DOC ELDER MAL SCRN: HCPCS | Performed by: INTERNAL MEDICINE

## 2022-04-14 PROCEDURE — 72170 X-RAY EXAM OF PELVIS: CPT

## 2022-04-14 PROCEDURE — 3017F COLORECTAL CA SCREEN DOC REV: CPT | Performed by: INTERNAL MEDICINE

## 2022-04-14 RX ORDER — FLUOXETINE 10 MG/1
CAPSULE ORAL
Qty: 45 CAPSULE | Refills: 2 | Status: SHIPPED | OUTPATIENT
Start: 2022-04-14 | End: 2022-09-20 | Stop reason: SDUPTHER
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
## 2022-04-14 NOTE — PROGRESS NOTES
Notify as expected has djd proceed with pt as planned  Incidental finding of constipation-miralax daily to get cleaned out advised

## 2022-04-15 NOTE — PROGRESS NOTES
Patient notified of djd finding on lumbar films & PCP's note to try PT. Discussed finding of constipation seen on x-ray & advice to try miralax to get cleaned out.

## 2022-05-09 ENCOUNTER — HOSPITAL ENCOUNTER (OUTPATIENT)
Dept: PHYSICAL THERAPY | Age: 72
Discharge: HOME | End: 2022-05-09
Payer: MEDICARE

## 2022-05-09 PROCEDURE — 97110 THERAPEUTIC EXERCISES: CPT | Performed by: PHYSICAL THERAPIST

## 2022-05-09 PROCEDURE — 97162 PT EVAL MOD COMPLEX 30 MIN: CPT | Performed by: PHYSICAL THERAPIST

## 2022-05-09 NOTE — PROGRESS NOTES
Physical Therapy at [facility],   a part of [facility]  [address] [place] [address] Drive  Phone: [phone]  Fax: [phone]    Plan of Care/Statement of Necessity for Physical Therapy Services  2-15    Patient name: Royce [facility]  : 1950  Provider#: [id number]  Referral source: Sherif Taylor MD      Medical/Treatment Diagnosis: Other low back pain [M54.59]     Prior Hospitalization: see medical history     Comorbidities: Osteoporosis, depression  Prior Level of Function: see initial eval  Medications: Verified on Patient Summary List  Start of Care: 2022      Onset Date: 2022   The Plan of Care and following information is based on the information from the initial evaluation. Assessment/ key information: The patient presents with chronic low back pain with a recent flare-up while gardening several weeks ago. The pain at this time is located along the base of the lumbar spine and is limiting her ROM, primarily with flexion. Evaluation Complexity History MEDIUM  Complexity : 1-2 comorbidities / personal factors will impact the outcome/ POC ; Examination MEDIUM Complexity : 3 Standardized tests and measures addressing body structure, function, activity limitation and / or participation in recreation  ;Presentation MEDIUM Complexity : Evolving with changing characteristics  ; Clinical Decision Making MEDIUM Complexity : FOTO score of 26-74  Overall Complexity Rating: MEDIUM    Problem List: pain affecting function, decrease ROM, decrease strength, decrease ADL/ functional abilitiies, decrease activity tolerance, decrease flexibility/ joint mobility and decrease transfer abilities   Treatment Plan may include any combination of the following: Therapeutic exercise, Therapeutic activities, Neuromuscular re-education, Physical agent/modality, Gait/balance training, Manual therapy, Patient education, Self Care training and Functional mobility training  Patient / Family readiness to learn indicated by: asking questions, trying to perform skills and interest  Persons(s) to be included in education: patient (P)  Barriers to Learning/Limitations: None  Patient Goal (s): I want to be able to bend forward with less pain.   Patient Self Reported Health Status: excellent  Rehabilitation Potential: excellent    Short Term Goals: To be accomplished in 4 weeks:  1. Patient will be able to bend forward and tie her shoes with <2/10 low back pain. 2. Patient will be able to walk two blocks with <2/10 low back pain. 3. Patient will be able to perform a sit-to-stand transfer with <2/10 low back pain. Long Term Goals: To be accomplished in 12 weeks:  1. Patient will be able to lift 10# from the floor without pain or limitation. 2. Patient will be able to walk 1/4 mile without pain or limitation. 3. Patient will be able to get in and out of a 1/2 kneeling position without pain or limitation. Frequency / Duration: Patient to be seen 2 times per week for 12 weeks. Patient/ Caregiver education and instruction: self care, activity modification and exercises    [x]  Plan of care has been reviewed with PTA        Certification Period: 5/9/2022 - 8/9/2022    Africa Jimenez, PT 5/9/2022     ________________________________________________________________________    I certify that the above Therapy Services are being furnished while the patient is under my care. I agree with the treatment plan and certify that this therapy is necessary.     Physician's Signature:____________________  Date:____________Time: _________         Eleonora Barrientos MD

## 2022-05-11 DIAGNOSIS — M47.816 SPONDYLOSIS OF LUMBAR REGION WITHOUT MYELOPATHY OR RADICULOPATHY: ICD-10-CM

## 2022-05-11 RX ORDER — SULINDAC 200 MG/1
TABLET ORAL
Qty: 30 TABLET | Refills: 0 | Status: SHIPPED | OUTPATIENT
Start: 2022-05-11

## 2022-05-26 ENCOUNTER — HOSPITAL ENCOUNTER (OUTPATIENT)
Dept: PHYSICAL THERAPY | Age: 72
Discharge: HOME | End: 2022-05-26
Payer: MEDICARE

## 2022-05-26 PROCEDURE — 97110 THERAPEUTIC EXERCISES: CPT | Performed by: PHYSICAL THERAPIST

## 2022-05-26 NOTE — PROGRESS NOTES
PT DAILY TREATMENT NOTE - [facility] -15    Patient Name: Darrell Aguirre  Date:2022  : 1950  [x]  Patient  Verified  Payor: Patricio Sadler / Plan: [place] / Product Type: Medicare /    In time: 12:00 PM Out time: 12:45 PM  Total Treatment Time (min): 45  Total Timed Codes (min): 45  1:1 Treatment Time ( [address] only): 45   Visit #:  2    Treatment Area: Other low back pain [M54.59]    SUBJECTIVE  Pain Level (0-10 scale): 0/10  Any medication changes, allergies to medications, adverse drug reactions, diagnosis change, or new procedure performed?: [x] No    [] Yes (see summary sheet for update)  Subjective functional status/changes:   [] No changes reported  Patient reports being able to perform her HEP daily and now she has no pain. OBJECTIVE       45 min Therapeutic Exercise:  [x]? See flow sheet :   Rationale: increase ROM, increase strength and improve coordination to improve the patients ability to walk without pain    With   [] TE   [] TA   [] Neuro   [] SC   [] other: Patient Education: [x] Review HEP    [] Progressed/Changed HEP based on:   [] positioning   [] body mechanics   [] transfers   [] heat/ice application    [] other:      Other Objective/Functional Measures:   No pain reported with all new ther ex     Pain Level (0-10 scale) post treatment: 0    ASSESSMENT/Changes in Function:   Patient is showing good initial progress with lumbopelvic stability program.  Patient will continue to benefit from skilled PT services to modify and progress therapeutic interventions, address functional mobility deficits, address ROM deficits, address strength deficits, analyze and address soft tissue restrictions, analyze and cue movement patterns, analyze and modify body mechanics/ergonomics and assess and modify postural abnormalities to attain remaining goals.      []  See Plan of Care  []  See progress note/recertification  []  See Discharge Summary         Progress towards goals / Updated goals:  Patient is showing great initial progress towards short term goals. Will f/u in two weeks on progress with new HEP.     PLAN  [x]  Upgrade activities as tolerated     [x]  Continue plan of care  []  Update interventions per flow sheet       []  Discharge due to:_  []  Other:_      Jose Alberto De Luna, PT 5/26/2022

## 2022-06-01 ENCOUNTER — APPOINTMENT (OUTPATIENT)
Dept: PHYSICAL THERAPY | Age: 72
End: 2022-06-01
Payer: MEDICARE

## 2022-06-03 ENCOUNTER — APPOINTMENT (OUTPATIENT)
Dept: PHYSICAL THERAPY | Age: 72
End: 2022-06-03
Payer: MEDICARE

## 2022-06-06 ENCOUNTER — HOSPITAL ENCOUNTER (OUTPATIENT)
Dept: PHYSICAL THERAPY | Age: 72
Discharge: HOME | End: 2022-06-06
Payer: MEDICARE

## 2022-06-06 PROCEDURE — 97110 THERAPEUTIC EXERCISES: CPT | Performed by: PHYSICAL MEDICINE & REHABILITATION

## 2022-06-06 NOTE — PROGRESS NOTES
PT DAILY TREATMENT NOTE - [facility] 2-15    Patient Name: Amada Cull  Date:2022  : 1950  [x]  Patient  Verified  Payor: Stan Teran / Plan: [place] & [place] / Product Type: Medicare /    In time: 900 AM Out time: 1000 AM  Total Treatment Time (min): 60  Total Timed Codes (min): 45  1:1 Treatment Time ( [address] only): 45   Visit #:  3    Treatment Area: Other low back pain [M54.59]    SUBJECTIVE  Pain Level (0-10 scale): 0/10  Any medication changes, allergies to medications, adverse drug reactions, diagnosis change, or new procedure performed?: [x] No    [] Yes (see summary sheet for update)  Subjective functional status/changes:   [] No changes reported  Patient reports starting to have pain in her upper back. Patient stated she still gets pain intermittently with ADLs. OBJECTIVE    Modality rationale: decrease pain and increase tissue extensibility to improve the patients ability to bend forward without pain    Min Type Additional Details     []? Estim: []? Att   []? Unatt        []? TENS instruct                  []?IFC  []? Premod   []? NMES                     []?Other:  []?w/US   []?w/ice   []?w/heat  Position:  Location:     []? Traction: []? Cervical       []? Lumbar                       []? Prone          []? Supine                       []?Intermittent   []? Continuous Lbs:  []? before manual  []? after manual  []?w/heat     []? Ultrasound: []? Continuous   []? Pulsed at:                           []? 1MHz   []? 3MHz Location:  W/cm2:     []? Paraffin         Location:   []?w/heat   15 []? Ice     [x]? Heat  []? Ice massage Position: supine  Location:low back     []? Laser  []? Other: Position:  Location:        []? Vasopneumatic Device Pressure:       []? lo []? med []? hi   Temperature:       [x]? Skin assessment post-treatment:  [x]? intact []? redness- no adverse reaction    []? redness - adverse reaction:        45 min Therapeutic Exercise:  [x]?  See flow sheet :   Rationale: increase ROM, increase strength and improve coordination to improve the patients ability to walk without pain    With   [] TE   [] TA   [] Neuro   [] SC   [] other: Patient Education: [x] Review HEP    [] Progressed/Changed HEP based on:   [] positioning   [] body mechanics   [] transfers   [] heat/ice application    [] other:      Other Objective/Functional Measures:   Increase in low back following SLS today. Worse during 2nd set. Max difficulty with standing PPT with inability to find IO/TA. Focus on supine at home. Pain Level (0-10 scale) post treatment: 0    ASSESSMENT/Changes in Function:     Patient will continue to benefit from skilled PT services to modify and progress therapeutic interventions, address functional mobility deficits, address ROM deficits, address strength deficits, analyze and address soft tissue restrictions, analyze and cue movement patterns, analyze and modify body mechanics/ergonomics and assess and modify postural abnormalities to attain remaining goals. []  See Plan of Care  []  See progress note/recertification  []  See Discharge Summary         Progress towards goals / Updated goals:  Patient with overall fair tolerance to today's session with increase in pain with SLS due to fatigue. Educated on importance of performing HEP stretches more than every other day and to try 2-3 x a day.      PLAN  [x]  Upgrade activities as tolerated     [x]  Continue plan of care  [x]  Update interventions per flow sheet       []  Discharge due to:_  []  Other:_      Charles Sam PTA, OPTA, CPT  6/6/2022

## 2022-06-09 ENCOUNTER — HOSPITAL ENCOUNTER (OUTPATIENT)
Dept: PHYSICAL THERAPY | Age: 72
Discharge: HOME | End: 2022-06-09
Payer: MEDICARE

## 2022-06-09 PROCEDURE — 97014 ELECTRIC STIMULATION THERAPY: CPT | Performed by: PHYSICAL THERAPIST

## 2022-06-09 PROCEDURE — 97110 THERAPEUTIC EXERCISES: CPT | Performed by: PHYSICAL THERAPIST

## 2022-06-09 NOTE — PROGRESS NOTES
PT DAILY TREATMENT NOTE - [facility] 2-15    Patient Name: Marisol Aguayo  Date:2022  : 1950  [x]  Patient  Verified  Payor: Ankush Shaver / Plan: [place] MED[place]ARE PART A & B / Product Type: Medicare /    In time: 8:45    Out time: 9:40  Total Treatment Time (min): 55  Total Timed Codes (min): 40  1:1 Treatment Time ( [address] only): 40  Visit #: 4    Treatment Area: Other low back pain [M54.59]    SUBJECTIVE  Pain Level (0-10 scale): 0   Any medication changes, allergies to medications, adverse drug reactions, diagnosis change, or new procedure performed?: [x] No    [] Yes (see summary sheet for update)  Subjective functional status/changes:   [] No changes reported  Pt reports that she completed SLS at home. The exercise is feeling good now and she feels her legs working during this exercise, no more back pain. Pt reports an instability catch when she bends forward to complete ADL's. OBJECTIVE    Modality rationale: decrease edema, decrease inflammation and decrease pain to improve the patients ability to garden without pain.     Min Type Additional Details      15 [x] Estim: []Att   [x]Unatt    []TENS instruct                  [x]IFC  []Premod   []NMES                     []Other:  []w/US   []w/ice   []w/heat  Position: seated  Location: low back        []  Traction: [] Cervical       []Lumbar                       [] Prone          []Supine                       []Intermittent   []Continuous Lbs:  [] before manual  [] after manual  []w/heat    []  Ultrasound: []Continuous   [] Pulsed                       at: []1MHz   []3MHz Location:  W/cm2:    [] Paraffin         Location:   []w/heat    []  Ice     []  Heat  []  Ice massage Position:   Location: l    []  Laser  []  Other: Position:  Location:      []  Vasopneumatic Device Pressure:       [] lo [] med [] hi   Temperature:      [x] Skin assessment post-treatment:  [x]intact []redness- no adverse reaction    []redness - adverse reaction:     40 min Therapeutic Exercise:  [x] See flow sheet :   Rationale: increase ROM, increase strength, improve coordination and improve balance to improve the patients ability to garden without pain. With   [] TE   [] TA   [] Neuro   [] SC   [] other: Patient Education: [x] Review HEP    [] Progressed/Changed HEP based on:   [] positioning   [] body mechanics   [] transfers   [] heat/ice application    [] other:      Other Objective/Functional Measures: Add foam to SLS     Pain Level (0-10 scale) post treatment: 0    ASSESSMENT/Changes in Function:   Pt demonstrating improvement in strength with increased resistance with standing exercise. Pt ready for foam pad or EC balance activity. Discussed w pt the importance of proper form while on the elliptical. She reports that she does short bursts of speed, but often experiences back pain after. Pt does not want to do heat today because it is hot in the gym, she is trying ESTIM instead. Patient will continue to benefit from skilled PT services to modify and progress therapeutic interventions, address functional mobility deficits, address ROM deficits, address strength deficits, analyze and cue movement patterns and analyze and modify body mechanics/ergonomics to attain remaining goals. []  See Plan of Care  []  See progress note/recertification  []  See Discharge Summary         Progress towards goals / Updated goals:  Pt pain levels have decreased. She still experiences some difficulty gardening but demonstrates increases in strength & compliance with HEP.      PLAN  [x]  Upgrade activities as tolerated     [x]  Continue plan of care  [x]  Update interventions per flow sheet       []  Discharge due to:_  []  Other:_      PAM Arredondo 6/9/2022   3 units therex  1 unit ESTIM

## 2022-06-15 ENCOUNTER — HOSPITAL ENCOUNTER (OUTPATIENT)
Dept: PHYSICAL THERAPY | Age: 72
Discharge: HOME | End: 2022-06-15
Payer: MEDICARE

## 2022-06-15 PROCEDURE — 97110 THERAPEUTIC EXERCISES: CPT | Performed by: PHYSICAL THERAPIST

## 2022-06-15 PROCEDURE — 97014 ELECTRIC STIMULATION THERAPY: CPT | Performed by: PHYSICAL THERAPIST

## 2022-06-15 NOTE — PROGRESS NOTES
PT DAILY TREATMENT NOTE - [facility] -15    Patient Name: Jackie Thomas  Date:6/15/2022  : 1950  [x]  Patient  Verified  Payor: Nanette Chris / Plan: [place] MEDICARE PART A & B / Product Type: Medicare /    In time: 8:40  Out time: 9:40  Total Treatment Time (min): 60  Total Timed Codes (min): 45  1:1 Treatment Time ( [address] only): 45  Visit #: 5    Treatment Area: Other low back pain [M54.59]    SUBJECTIVE  Pain Level (0-10 scale): 2  Any medication changes, allergies to medications, adverse drug reactions, diagnosis change, or new procedure performed?: [x] No    [] Yes (see summary sheet for update)  Subjective functional status/changes:   [] No changes reported  Pt reports that her back pain is aggravated with extended time periods standing and with quilting for long periods of time. Pt reports that sx last for 2-3 hours after aggravation, she takes an anti - inflammatory. OBJECTIVE    Modality rationale: decrease edema, decrease inflammation and decrease pain to improve the patients ability to walk for increased periods of time without increase in pain.     Min Type Additional Details      15 [x] Estim: []Att   [x]Unatt    []TENS instruct                  []IFC  []Premod   []NMES                     []Other:  []w/US   []w/ice   [x]w/heat  Position: supine w bolster   Location: lumbar spine        []  Traction: [] Cervical       []Lumbar                       [] Prone          []Supine                       []Intermittent   []Continuous Lbs:  [] before manual  [] after manual  []w/heat    []  Ultrasound: []Continuous   [] Pulsed                       at: []1MHz   []3MHz Location:  W/cm2:    [] Paraffin         Location:   []w/heat    []  Ice     []  Heat  []  Ice massage Position:  Location:    []  Laser  []  Other: Position:  Location:      []  Vasopneumatic Device Pressure:       [] lo [] med [] hi   Temperature:      [x] Skin assessment post-treatment:  [x]intact []redness- no adverse reaction    []redness - adverse reaction:     40 min Therapeutic Exercise:  [] See flow sheet :   Rationale: increase ROM, increase strength, improve coordination and improve balance to improve the patients ability to stand for extended periods of time without increase in back pain. With   [] TE   [] TA   [] Neuro   [] SC   [] other: Patient Education: [x] Review HEP    [] Progressed/Changed HEP based on:   [] positioning   [] body mechanics   [] transfers   [] heat/ice application    [] other:      Other Objective/Functional Measures:   Pt ready to discharge next visit  Try sargent push pull/punch with weight/pallof press w pelvic tilt for core stability w pressing during quilting    Pain Level (0-10 scale) post treatment: 1    ASSESSMENT/Changes in Function:   Pt shows good tolerance for advanced exercises today. Maintains SLS on foam pad with 2 LOB, progression from last visit SLS on stable ground. Pt also initiates hip hinge and lateral stepping today, squat w band. Pt states that she met all of her goals she had from PT and she now knows what triggers pain. We discussed using PT exercises as a maintenance program in the future. I advised her to take breaks from both extended sitting & standing to prevent pain. Patient will continue to benefit from skilled PT services to modify and progress therapeutic interventions, address functional mobility deficits, address ROM deficits, address strength deficits, analyze and cue movement patterns, analyze and modify body mechanics/ergonomics and assess and modify postural abnormalities to attain remaining goals. []  See Plan of Care  []  See progress note/recertification  []  See Discharge Summary         Progress towards goals / Updated goals:  Evaluate LT goals at next treatment session.      PLAN  [x]  Upgrade activities as tolerated     []  Continue plan of care  [x]  Update interventions per flow sheet       []  Discharge due to:_  []  Other:_      Mary Troncoso, SPT 6/15/2022   King Terrell, PT , DPT, OCS, Cert.  DN

## 2022-06-17 ENCOUNTER — APPOINTMENT (OUTPATIENT)
Dept: PHYSICAL THERAPY | Age: 72
End: 2022-06-17
Payer: MEDICARE

## 2022-08-05 NOTE — PROGRESS NOTES
Physical Therapy at [facility],   a part of  [facility]  [address] [place] [address], Karo Crespo  Phone: [phone] Fax: [phone]      Discharge Summary 2-15    Patient name: Reese Cartwright  : 1950  Provider#: [id number]  Referral source: Samuel Rader MD      Medical/Treatment Diagnosis: Other low back pain [M54.59]     Prior Hospitalization: see medical history     Comorbidities: See Plan of Care  Prior Level of Function: See Plan of Care  Medications: Verified on Patient Summary List    Start of Care: 2022      Onset Date:2022   Visits from Start of Care: 5     Missed Visits: 0  Reporting Period : 22 to 22    Assessment/Summary of care: Patient did well with PT with a focus on therapeutic exercises to address her chronic low back pain. She achieved full ROM, improved lumbopelvic stability, and independence in a HEP. She has not needed to return to the clinic over the last 8 weeks and will now be discharged. Short Term Goals: To be accomplished in 4 weeks:  1. Patient will be able to bend forward and tie her shoes with <2/10 low back pain. Met.  2. Patient will be able to walk two blocks with <2/10 low back pain. Met.  3. Patient will be able to perform a sit-to-stand transfer with <2/10 low back pain. Met.  Long Term Goals: To be accomplished in 12 weeks:  1. Patient will be able to lift 10# from the floor without pain or limitation. Met.  2. Patient will be able to walk 1/4 mile without pain or limitation. Met.  3. Patient will be able to get in and out of a 1/2 kneeling position without pain or limitation. Met.     RECOMMENDATIONS:  [x]Discontinue therapy: [x]Patient has reached or is progressing toward set goals     []Patient is non-compliant or has abdicated     []Due to lack of appreciable progress towards set goals     []Other  Nitin Cuenca, PT 2022

## 2022-09-20 ENCOUNTER — OFFICE VISIT (OUTPATIENT)
Dept: INTERNAL MEDICINE CLINIC | Age: 72
End: 2022-09-20
Payer: MEDICARE

## 2022-09-20 VITALS
HEIGHT: 65 IN | SYSTOLIC BLOOD PRESSURE: 113 MMHG | WEIGHT: 155.6 LBS | DIASTOLIC BLOOD PRESSURE: 69 MMHG | TEMPERATURE: 97.6 F | RESPIRATION RATE: 16 BRPM | HEART RATE: 54 BPM | BODY MASS INDEX: 25.92 KG/M2 | OXYGEN SATURATION: 97 %

## 2022-09-20 DIAGNOSIS — F34.1 DYSTHYMIA: ICD-10-CM

## 2022-09-20 DIAGNOSIS — Z23 ENCOUNTER FOR IMMUNIZATION: ICD-10-CM

## 2022-09-20 DIAGNOSIS — K51.90 ULCERATIVE COLITIS WITHOUT COMPLICATIONS, UNSPECIFIED LOCATION (HCC): ICD-10-CM

## 2022-09-20 DIAGNOSIS — M47.816 SPONDYLOSIS OF LUMBAR REGION WITHOUT MYELOPATHY OR RADICULOPATHY: ICD-10-CM

## 2022-09-20 DIAGNOSIS — H90.0 CONDUCTIVE HEARING LOSS, BILATERAL: ICD-10-CM

## 2022-09-20 DIAGNOSIS — Z78.0 POSTMENOPAUSAL: ICD-10-CM

## 2022-09-20 DIAGNOSIS — Z12.31 BREAST CANCER SCREENING BY MAMMOGRAM: ICD-10-CM

## 2022-09-20 DIAGNOSIS — Z00.00 MEDICARE ANNUAL WELLNESS VISIT, SUBSEQUENT: Primary | ICD-10-CM

## 2022-09-20 PROCEDURE — 99214 OFFICE O/P EST MOD 30 MIN: CPT | Performed by: INTERNAL MEDICINE

## 2022-09-20 PROCEDURE — 90715 TDAP VACCINE 7 YRS/> IM: CPT | Performed by: INTERNAL MEDICINE

## 2022-09-20 PROCEDURE — G8536 NO DOC ELDER MAL SCRN: HCPCS | Performed by: INTERNAL MEDICINE

## 2022-09-20 PROCEDURE — 3017F COLORECTAL CA SCREEN DOC REV: CPT | Performed by: INTERNAL MEDICINE

## 2022-09-20 PROCEDURE — 1101F PT FALLS ASSESS-DOCD LE1/YR: CPT | Performed by: INTERNAL MEDICINE

## 2022-09-20 PROCEDURE — G8427 DOCREV CUR MEDS BY ELIG CLIN: HCPCS | Performed by: INTERNAL MEDICINE

## 2022-09-20 PROCEDURE — G9717 DOC PT DX DEP/BP F/U NT REQ: HCPCS | Performed by: INTERNAL MEDICINE

## 2022-09-20 PROCEDURE — G0463 HOSPITAL OUTPT CLINIC VISIT: HCPCS | Performed by: INTERNAL MEDICINE

## 2022-09-20 PROCEDURE — G0439 PPPS, SUBSEQ VISIT: HCPCS | Performed by: INTERNAL MEDICINE

## 2022-09-20 PROCEDURE — 1090F PRES/ABSN URINE INCON ASSESS: CPT | Performed by: INTERNAL MEDICINE

## 2022-09-20 PROCEDURE — G8399 PT W/DXA RESULTS DOCUMENT: HCPCS | Performed by: INTERNAL MEDICINE

## 2022-09-20 PROCEDURE — G9899 SCRN MAM PERF RSLTS DOC: HCPCS | Performed by: INTERNAL MEDICINE

## 2022-09-20 PROCEDURE — G8417 CALC BMI ABV UP PARAM F/U: HCPCS | Performed by: INTERNAL MEDICINE

## 2022-09-20 RX ORDER — FLUOXETINE 10 MG/1
CAPSULE ORAL
Qty: 45 CAPSULE | Refills: 2 | Status: SHIPPED | OUTPATIENT
Start: 2022-09-20

## 2022-09-20 NOTE — PROGRESS NOTES
HISTORY OF PRESENT ILLNESS  Rajani Decker is a 67 y.o. female. HPI  Seen for wellness review, but does have several issues:  1. Chronic lumbar back disease with spondylosis. Uses Sulindac p.r.n. Denies any recent GI side effect from it. She is active. Goes to the gym and looking forward to hiking in the mountains. Does back exercises. 2. Mood. On fluoxetine 10 mg every other day. Very stable. Notes that off medication she did have darkening mood. No side effects. 3. History of ulcerative colitis. No recent flares. Using MiraLax p.r.n. Discussed strategies to keep things more regular with half a scoop every other day. 4. Preventive care. She is due for mammogram and bone density. Due for Tdap. Lipids have been stable for years. We will not repeat. No tobacco.  Drinks on average seven glasses of wine a week. Has followed a Noom diet and subsequently lost 20 pounds and maintained this weight loss, exercising at least three days a week. Review of Systems   Constitutional:  Negative for chills, diaphoresis, fever and weight loss. HENT:  Positive for hearing loss. Respiratory:  Negative for cough, shortness of breath and wheezing. Cardiovascular:  Negative for chest pain, palpitations, orthopnea, leg swelling and PND. Gastrointestinal:  Positive for constipation. Negative for abdominal pain, blood in stool, diarrhea, heartburn, melena, nausea and vomiting. Genitourinary:  Negative for dysuria. Musculoskeletal:  Negative for falls and myalgias. Neurological:  Negative for dizziness, focal weakness and headaches. Psychiatric/Behavioral:  Negative for depression and memory loss. Physical Exam  Vitals and nursing note reviewed. Constitutional:       Appearance: She is well-developed. HENT:      Head: Normocephalic and atraumatic.       Right Ear: Tympanic membrane normal.      Left Ear: Tympanic membrane normal.   Eyes:      Pupils: Pupils are equal, round, and reactive to light. Neck:      Thyroid: No thyromegaly. Vascular: No carotid bruit. Cardiovascular:      Rate and Rhythm: Normal rate and regular rhythm. Heart sounds: Normal heart sounds, S1 normal and S2 normal. No murmur heard. Pulmonary:      Effort: Pulmonary effort is normal. No respiratory distress. Breath sounds: Normal breath sounds. No wheezing or rales. Abdominal:      General: There is no distension. Palpations: Abdomen is soft. There is no mass. Tenderness: There is no abdominal tenderness. Genitourinary:     Comments: Breast exam bilaterally without masses axillary nodes or discharge. BSE reviewed     Musculoskeletal:      Cervical back: Normal range of motion and neck supple. Right lower leg: No edema. Left lower leg: No edema. Skin:     Findings: No rash. Neurological:      General: No focal deficit present. Mental Status: She is alert and oriented to person, place, and time. Psychiatric:         Behavior: Behavior normal.       ASSESSMENT and PLAN  Diagnoses and all orders for this visit:    1. Medicare annual wellness visit, subsequent    2. Breast cancer screening by mammogram  -     [facility] 3D JENNIFER W MAMMO BI SCREENING INCL CAD; Future    3. Dysthymia-stable cont med  -     FLUoxetine (PROzac) 10 mg capsule; 1 po qod    4. Ulcerative colitis without complications, unspecified location (HCC)-advised miralax every other day to have more regular stools  -     CBC WITH AUTOMATED DIFF; Future  -     METABOLIC PANEL, COMPREHENSIVE; Future  -     TSH 3RD GENERATION; Future  -     HEPATITIS C AB; Future    5. Spondylosis of lumbar region without myelopathy or radiculopathy    6. Conductive hearing loss, bilateral    7. Encounter for immunization  -     TDAP, BOOSTRIX, (AGE 10 YRS+), IM    8. Postmenopausal  -     DEXA BONE DENSITY STUDY AXIAL;  Future  This is the Subsequent Medicare Annual Wellness Exam, performed 12 months or more after the Initial AWV or the last Subsequent AWV    I have reviewed the patient's medical history in detail and updated the computerized patient record. Assessment/Plan   Education and counseling provided:  Are appropriate based on today's review and evaluation  Influenza Vaccine  Screening Mammography  Bone mass measurement (DEXA)    1. Medicare annual wellness visit, subsequent  2. Breast cancer screening by mammogram  -     [facility] 3D JENNIFER W MAMMO BI SCREENING INCL CAD; Future  3. Dysthymia  -     FLUoxetine (PROzac) 10 mg capsule; 1 po qod, Normal, Disp-45 Capsule, R-2  4. Ulcerative colitis without complications, unspecified location ([facility] [address]  -     CBC WITH AUTOMATED DIFF; Future  -     METABOLIC PANEL, COMPREHENSIVE; Future  -     TSH 3RD GENERATION; Future  -     HEPATITIS C AB; Future  5. Spondylosis of lumbar region without myelopathy or radiculopathy  6. Conductive hearing loss, bilateral  7. Encounter for immunization  -     TDAP, BOOSTRIX, (AGE 10 YRS+), IM  8. Postmenopausal  -     DEXA BONE DENSITY STUDY AXIAL; Future       Depression Risk Factor Screening     3 most recent PHQ Screens 9/20/2022   Little interest or pleasure in doing things Not at all   Feeling down, depressed, irritable, or hopeless Not at all   Total Score PHQ 2 0       Alcohol & Drug Abuse Risk Screen   Do you average more than 1 drink per night or more than 7 drinks a week?: (P) No  On any one occasion in the past three months have you had more than 3 drinks containing alcohol?: (P) Yes          Functional Ability and Level of Safety   Hearing:  Hearing: (P) Patient wears hearing aid    Activities of Daily Living: The home contains: (P) handrails, grab bars, rugs  Functional ADLs: (P) Patient does total self care   Ambulation:  Patient ambulates: (P) with no difficulty   Fall Risk:  Fall Risk Assessment, last 12 mths 9/20/2022   Able to walk? Yes   Fall in past 12 months? 0   Do you feel unsteady?  0   Are you worried about falling 0   Number of falls in past 12 months -   Fall with injury? -     Abuse Screen:  Do you ever feel afraid of your partner?: (P) No  Are you in a relationship with someone who physically or mentally threatens you?: (P) No  Is it safe for you to go home?: (P) Yes      Cognitive Screening   Has your family/caregiver stated any concerns about your memory?: (P) No   Cognitive Screening: Normal - Verbal Fluency Test    Health Maintenance Due     Health Maintenance Due   Topic Date Due    Hepatitis C Screening  Never done    Breast Cancer Screen Mammogram  08/14/2021    COVID-19 Vaccine (4 - Booster for [employer] series) 02/04/2022    Medicare Yearly Exam  02/05/2022    Flu Vaccine (1) 09/01/2022       Patient Care Team   Patient Care Team:  Shahzad Mccain MD as PCP - Michelle Miller MD as PCP - [facility] Empaneled Provider    History     Patient Active Problem List   Diagnosis Code    DJD (degenerative joint disease), lumbar M47.816    Dysthymia F34.1    Ulcerative colitis ([facility] [address] K51.90    Menopause Z78.0    Bilateral hearing loss H91.93     Past Medical History:   Diagnosis Date    Bilateral hearing loss 2/4/2021    Hearing aides    DJD (degenerative joint disease), lumbar 6/9/2009    Dysthymia 6/9/2009    Ulcerative colitis ([facility] [address] 6/9/2009      Past Surgical History:   Procedure Laterality Date    COLONOSCOPY N/A 8/13/2018    COLONOSCOPY performed by Cristóbal Lopez MD at [address] [place] [address] 63, COLON, DIAGNOSTIC  11/29/11    dr Farhan Archer normal    HX GYN      vaginal birth     Current Outpatient Medications   Medication Sig Dispense Refill    FLUoxetine (PROzac) 10 mg capsule 1 po qod 45 Capsule 2    sulindac (CLINORIL) 200 mg tablet TAKE ONE TABLET BY MOUTH DAILY AS NEEDED FOR PAIN 30 Tablet 0    polyethylene glycol (MIRALAX) 17 gram/dose powder Take 17 g by mouth daily as needed for Constipation. 289 g 0    cetirizine (ZYRTEC) 10 mg tablet Take 1 Tablet by mouth daily. 14 Tablet 0    metroNIDAZOLE (METROCREAM) 0.75 % topical cream as directed. cholecalciferol (VITAMIN D3) 25 mcg (1,000 unit) cap Take 2,000 Units by mouth. SF 5000 PLUS 1.1 % crea as directed. No Known Allergies    Family History   Problem Relation Age of Onset    Hypertension Sister      Social History     Tobacco Use    Smoking status: Never    Smokeless tobacco: Never   Substance Use Topics    Alcohol use:  Yes     Alcohol/week: 5.0 standard drinks     Types: 6 Glasses of wine per week         Lukasz Randall MD

## 2022-09-20 NOTE — PATIENT INSTRUCTIONS
Medicare Wellness Visit, Female     The best way to live healthy is to have a lifestyle where you eat a well-balanced diet, exercise regularly, limit alcohol use, and quit all forms of tobacco/nicotine, if applicable. Regular preventive services are another way to keep healthy. Preventive services (vaccines, screening tests, monitoring & exams) can help personalize your care plan, which helps you manage your own care. Screening tests can find health problems at the earliest stages, when they are easiest to treat. [facility] follows the current, evidence-based guidelines published by the [facility] ([facility]) when recommending preventive services for our patients. Because we follow these guidelines, sometimes recommendations change over time as research supports it. (For example, mammograms used to be recommended annually. Even though Medicare will still pay for an annual mammogram, the newer guidelines recommend a mammogram every two years for women of average risk). Of course, you and your doctor may decide to screen more often for some diseases, based on your risk and your co-morbidities (chronic disease you are already diagnosed with). Preventive services for you include:  - Medicare offers their members a free annual wellness visit, which is time for you and your primary care provider to discuss and plan for your preventive service needs. Take advantage of this benefit every year!  -All adults over the age of 72 should receive the recommended pneumonia vaccines. Current USPSTF guidelines recommend a series of two vaccines for the best pneumonia protection.   -All adults should have a flu vaccine yearly and a tetanus vaccine every 10 years.   -All adults age 48 and older should receive the shingles vaccines (series of two vaccines).       -All adults age 38-68 who are overweight should have a diabetes screening test once every three years.   -All adults born between [birth year] and 1965 should be screened once for Hepatitis C.  -Other screening tests and preventive services for persons with diabetes include: an eye exam to screen for diabetic retinopathy, a kidney function test, a foot exam, and stricter control over your cholesterol.   -Cardiovascular screening for adults with routine risk involves an electrocardiogram (ECG) at intervals determined by your doctor.   -Colorectal cancer screenings should be done for adults age 54-65 with no increased risk factors for colorectal cancer. There are a number of acceptable methods of screening for this type of cancer. Each test has its own benefits and drawbacks. Discuss with your doctor what is most appropriate for you during your annual wellness visit. The different tests include: colonoscopy (considered the best screening method), a fecal occult blood test, a fecal DNA test, and sigmoidoscopy.    -A bone mass density test is recommended when a woman turns 65 to screen for osteoporosis. This test is only recommended one time, as a screening. Some providers will use this same test as a disease monitoring tool if you already have osteoporosis. -Breast cancer screenings are recommended every other year for women of normal risk, age 54-69.  -Cervical cancer screenings for women over age 72 are only recommended with certain risk factors.      Here is a list of your current Health Maintenance items (your personalized list of preventive services) with a due date:  Health Maintenance Due   Topic Date Due    Hepatitis C Test  Never done    Mammogram  08/14/2021    COVID-19 Vaccine (4 - Booster for [employer] series) 02/04/2022    Annual Well Visit  02/05/2022    Yearly Flu Vaccine (1) 09/01/2022

## 2022-09-21 LAB
ALBUMIN SERPL-MCNC: 3.7 G/DL (ref 3.5–5)
ALBUMIN/GLOB SERPL: 1.2 {RATIO} (ref 1.1–2.2)
ALP SERPL-CCNC: 60 U/L (ref 45–117)
ALT SERPL-CCNC: 16 U/L (ref 12–78)
ANION GAP SERPL CALC-SCNC: 6 MMOL/L (ref 5–15)
AST SERPL-CCNC: 13 U/L (ref 15–37)
BASOPHILS # BLD: 0 K/UL (ref 0–0.1)
BASOPHILS NFR BLD: 1 % (ref 0–1)
BILIRUB SERPL-MCNC: 0.4 MG/DL (ref 0.2–1)
BUN SERPL-MCNC: 11 MG/DL (ref 6–20)
BUN/CREAT SERPL: 16 (ref 12–20)
CALCIUM SERPL-MCNC: 9.2 MG/DL (ref 8.5–10.1)
CHLORIDE SERPL-SCNC: 101 MMOL/L (ref 97–108)
CO2 SERPL-SCNC: 28 MMOL/L (ref 21–32)
CREAT SERPL-MCNC: 0.69 MG/DL (ref 0.55–1.02)
DIFFERENTIAL METHOD BLD: ABNORMAL
EOSINOPHIL # BLD: 0.1 K/UL (ref 0–0.4)
EOSINOPHIL NFR BLD: 3 % (ref 0–7)
ERYTHROCYTE [DISTWIDTH] IN BLOOD BY AUTOMATED COUNT: 12.8 % (ref 11.5–14.5)
GLOBULIN SER CALC-MCNC: 3 G/DL (ref 2–4)
GLUCOSE SERPL-MCNC: 89 MG/DL (ref 65–100)
HCT VFR BLD AUTO: 38.7 % (ref 35–47)
HCV AB SERPL QL IA: NONREACTIVE
HGB BLD-MCNC: 12.8 G/DL (ref 11.5–16)
IMM GRANULOCYTES # BLD AUTO: 0 K/UL (ref 0–0.04)
IMM GRANULOCYTES NFR BLD AUTO: 0 % (ref 0–0.5)
LYMPHOCYTES # BLD: 0.9 K/UL (ref 0.8–3.5)
LYMPHOCYTES NFR BLD: 25 % (ref 12–49)
MCH RBC QN AUTO: 31.4 PG (ref 26–34)
MCHC RBC AUTO-ENTMCNC: 33.1 G/DL (ref 30–36.5)
MCV RBC AUTO: 94.9 FL (ref 80–99)
MONOCYTES # BLD: 0.4 K/UL (ref 0–1)
MONOCYTES NFR BLD: 11 % (ref 5–13)
NEUTS SEG # BLD: 2.1 K/UL (ref 1.8–8)
NEUTS SEG NFR BLD: 60 % (ref 32–75)
NRBC # BLD: 0 K/UL (ref 0–0.01)
NRBC BLD-RTO: 0 PER 100 WBC
PLATELET # BLD AUTO: 288 K/UL (ref 150–400)
PMV BLD AUTO: 9.3 FL (ref 8.9–12.9)
POTASSIUM SERPL-SCNC: 4.5 MMOL/L (ref 3.5–5.1)
PROT SERPL-MCNC: 6.7 G/DL (ref 6.4–8.2)
RBC # BLD AUTO: 4.08 M/UL (ref 3.8–5.2)
SODIUM SERPL-SCNC: 135 MMOL/L (ref 136–145)
TSH SERPL DL<=0.05 MIU/L-ACNC: 1.42 UIU/ML (ref 0.36–3.74)
WBC # BLD AUTO: 3.5 K/UL (ref 3.6–11)

## 2022-10-19 ENCOUNTER — HOSPITAL ENCOUNTER (OUTPATIENT)
Dept: MAMMOGRAPHY | Age: 72
Discharge: HOME | End: 2022-10-19
Attending: INTERNAL MEDICINE
Payer: MEDICARE

## 2022-10-19 DIAGNOSIS — Z78.0 POSTMENOPAUSAL: ICD-10-CM

## 2022-10-19 DIAGNOSIS — Z12.31 BREAST CANCER SCREENING BY MAMMOGRAM: ICD-10-CM

## 2022-10-19 PROCEDURE — 77080 DXA BONE DENSITY AXIAL: CPT

## 2022-10-19 PROCEDURE — 77063 BREAST TOMOSYNTHESIS BI: CPT

## 2022-11-29 DIAGNOSIS — M47.816 SPONDYLOSIS OF LUMBAR REGION WITHOUT MYELOPATHY OR RADICULOPATHY: ICD-10-CM

## 2022-11-30 RX ORDER — SULINDAC 200 MG/1
TABLET ORAL
Qty: 30 TABLET | Refills: 0 | Status: SHIPPED | OUTPATIENT
Start: 2022-11-30

## 2023-02-20 ENCOUNTER — OFFICE VISIT (OUTPATIENT)
Dept: INTERNAL MEDICINE CLINIC | Age: 73
End: 2023-02-20
Payer: MEDICARE

## 2023-02-20 VITALS
BODY MASS INDEX: 26.86 KG/M2 | WEIGHT: 161.2 LBS | SYSTOLIC BLOOD PRESSURE: 134 MMHG | OXYGEN SATURATION: 98 % | HEIGHT: 65 IN | HEART RATE: 57 BPM | DIASTOLIC BLOOD PRESSURE: 75 MMHG | TEMPERATURE: 97.6 F | RESPIRATION RATE: 16 BRPM

## 2023-02-20 DIAGNOSIS — M47.816 SPONDYLOSIS OF LUMBAR REGION WITHOUT MYELOPATHY OR RADICULOPATHY: Primary | ICD-10-CM

## 2023-02-20 DIAGNOSIS — Z78.0 OSTEOPENIA AFTER MENOPAUSE: ICD-10-CM

## 2023-02-20 DIAGNOSIS — M85.80 OSTEOPENIA AFTER MENOPAUSE: ICD-10-CM

## 2023-02-20 DIAGNOSIS — K51.90 ULCERATIVE COLITIS WITHOUT COMPLICATIONS, UNSPECIFIED LOCATION (HCC): ICD-10-CM

## 2023-02-20 PROCEDURE — 1101F PT FALLS ASSESS-DOCD LE1/YR: CPT | Performed by: INTERNAL MEDICINE

## 2023-02-20 PROCEDURE — G0463 HOSPITAL OUTPT CLINIC VISIT: HCPCS | Performed by: INTERNAL MEDICINE

## 2023-02-20 PROCEDURE — G9717 DOC PT DX DEP/BP F/U NT REQ: HCPCS | Performed by: INTERNAL MEDICINE

## 2023-02-20 PROCEDURE — G8399 PT W/DXA RESULTS DOCUMENT: HCPCS | Performed by: INTERNAL MEDICINE

## 2023-02-20 PROCEDURE — G9899 SCRN MAM PERF RSLTS DOC: HCPCS | Performed by: INTERNAL MEDICINE

## 2023-02-20 PROCEDURE — 99214 OFFICE O/P EST MOD 30 MIN: CPT | Performed by: INTERNAL MEDICINE

## 2023-02-20 PROCEDURE — G8536 NO DOC ELDER MAL SCRN: HCPCS | Performed by: INTERNAL MEDICINE

## 2023-02-20 PROCEDURE — 3017F COLORECTAL CA SCREEN DOC REV: CPT | Performed by: INTERNAL MEDICINE

## 2023-02-20 PROCEDURE — G8417 CALC BMI ABV UP PARAM F/U: HCPCS | Performed by: INTERNAL MEDICINE

## 2023-02-20 PROCEDURE — 1090F PRES/ABSN URINE INCON ASSESS: CPT | Performed by: INTERNAL MEDICINE

## 2023-02-20 PROCEDURE — G8427 DOCREV CUR MEDS BY ELIG CLIN: HCPCS | Performed by: INTERNAL MEDICINE

## 2023-02-20 NOTE — PROGRESS NOTES
HISTORY OF PRESENT ILLNESS  Mario Swartz is a 67 y.o. female. HPI  Seen to discuss several questions:  1. She has had intermittent low back pain, flares with gardening. Xrays from a year ago did show facet arthropathy and some curvature and loss of disc space. She is not having weakness or numbness. Uses Clinoril perhaps twice a month when things are very flared. 2. Constipation. Had a normal colonoscopy within the last few years. Managing with MiraLAX every other day. Encouraged to continue. 3. Osteopenia in 2018 and again in 2022. Reviewed bone density findings. Continue vitamin D and weightbearing exercise. 4. Mood, stable on the Fluoxetine 10. Review of Systems   Constitutional:  Negative for chills, fever and weight loss. Respiratory:  Negative for cough, shortness of breath and wheezing. Cardiovascular:  Negative for chest pain, palpitations, orthopnea, leg swelling and PND. Gastrointestinal:  Positive for constipation. Negative for abdominal pain, blood in stool, heartburn and nausea. Musculoskeletal:  Positive for back pain. Negative for falls and myalgias. Neurological:  Negative for dizziness and headaches. Psychiatric/Behavioral:  Negative for depression. Physical Exam  Vitals and nursing note reviewed. Constitutional:       Appearance: She is well-developed. Neurological:      Mental Status: She is alert and oriented to person, place, and time. Psychiatric:         Behavior: Behavior normal.         Thought Content: Thought content normal.         Judgment: Judgment normal.       ASSESSMENT and PLAN  Diagnoses and all orders for this visit:    1. Spondylosis of lumbar region without myelopathy or radiculopathy-reviewed xr findings  Ok to use rare nsaid encouraged tylenol as first choice    2. Ulcerative colitis without complications, unspecified location ([facility] [address]  No flares  Cont gi follow up  3.  Osteopenia after menopause  Reviewed dexa findings for last 2 studies  Stable since  2018  Cont vit d and exercise

## 2023-06-02 ENCOUNTER — TELEPHONE (OUTPATIENT)
Age: 73
End: 2023-06-02

## 2023-06-02 NOTE — TELEPHONE ENCOUNTER
Cannot just refill this medication. Pt needs an appt per PCP to address. Can see the NP if next available opening will not work for the patient.

## 2023-06-02 NOTE — TELEPHONE ENCOUNTER
Refill for rx:  Metronidazole    [facility] to [facility]  [address]   [address]   Store number: [id number]

## 2023-06-02 NOTE — TELEPHONE ENCOUNTER
Patient is wanting a refill of Metronidazole and is upset that she has to make an appointment with her PCP. Patient declined to make an appointment with PCP when PSR attempted to schedule one. Patient states she does not understand why she needs to come in as she states she has discussed this medication and why she takes it at her appointment in February with her PCP. PSR informed her that the nurse stated Dr. Krystal Lee does not want to fill this without an appointment. PSR also stated it looks like her PCP is not the one prescribed it either. Patient stated her dermatologist did. Patient would like a call back from the nurse to discuss this.

## 2023-10-03 SDOH — ECONOMIC STABILITY: INCOME INSECURITY: HOW HARD IS IT FOR YOU TO PAY FOR THE VERY BASICS LIKE FOOD, HOUSING, MEDICAL CARE, AND HEATING?: NOT HARD AT ALL

## 2023-10-03 SDOH — ECONOMIC STABILITY: FOOD INSECURITY: WITHIN THE PAST 12 MONTHS, YOU WORRIED THAT YOUR FOOD WOULD RUN OUT BEFORE YOU GOT MONEY TO BUY MORE.: NEVER TRUE

## 2023-10-03 SDOH — ECONOMIC STABILITY: TRANSPORTATION INSECURITY
IN THE PAST 12 MONTHS, HAS LACK OF TRANSPORTATION KEPT YOU FROM MEETINGS, WORK, OR FROM GETTING THINGS NEEDED FOR DAILY LIVING?: NO

## 2023-10-03 SDOH — ECONOMIC STABILITY: FOOD INSECURITY: WITHIN THE PAST 12 MONTHS, THE FOOD YOU BOUGHT JUST DIDN'T LAST AND YOU DIDN'T HAVE MONEY TO GET MORE.: NEVER TRUE

## 2023-10-03 SDOH — ECONOMIC STABILITY: HOUSING INSECURITY
IN THE LAST 12 MONTHS, WAS THERE A TIME WHEN YOU DID NOT HAVE A STEADY PLACE TO SLEEP OR SLEPT IN A SHELTER (INCLUDING NOW)?: NO

## 2023-10-04 ENCOUNTER — OFFICE VISIT (OUTPATIENT)
Age: 73
End: 2023-10-04

## 2023-10-04 VITALS
HEART RATE: 56 BPM | WEIGHT: 159.8 LBS | OXYGEN SATURATION: 98 % | TEMPERATURE: 98.4 F | SYSTOLIC BLOOD PRESSURE: 120 MMHG | RESPIRATION RATE: 16 BRPM | HEIGHT: 65 IN | DIASTOLIC BLOOD PRESSURE: 68 MMHG | BODY MASS INDEX: 26.62 KG/M2

## 2023-10-04 DIAGNOSIS — Z09 HOSPITAL DISCHARGE FOLLOW-UP: Primary | ICD-10-CM

## 2023-10-04 DIAGNOSIS — R19.7 DIARRHEA, UNSPECIFIED TYPE: ICD-10-CM

## 2023-10-04 DIAGNOSIS — G93.40 ACUTE ENCEPHALOPATHY: ICD-10-CM

## 2023-10-04 DIAGNOSIS — N30.00 ACUTE CYSTITIS WITHOUT HEMATURIA: ICD-10-CM

## 2023-10-04 DIAGNOSIS — E87.1 HYPONATREMIA: ICD-10-CM

## 2023-10-04 RX ORDER — CEPHALEXIN 500 MG/1
500 CAPSULE ORAL 3 TIMES DAILY
COMMUNITY
Start: 2023-09-27

## 2023-10-04 RX ORDER — LOPERAMIDE HYDROCHLORIDE 2 MG/1
CAPSULE ORAL
COMMUNITY
Start: 2023-09-27 | End: 2023-10-04

## 2023-10-04 RX ORDER — LOPERAMIDE HYDROCHLORIDE 2 MG/1
2 CAPSULE ORAL 3 TIMES DAILY PRN
COMMUNITY
Start: 2023-09-27

## 2023-10-04 ASSESSMENT — PATIENT HEALTH QUESTIONNAIRE - PHQ9
2. FEELING DOWN, DEPRESSED OR HOPELESS: 0
SUM OF ALL RESPONSES TO PHQ9 QUESTIONS 1 & 2: 0
1. LITTLE INTEREST OR PLEASURE IN DOING THINGS: 0
SUM OF ALL RESPONSES TO PHQ QUESTIONS 1-9: 0

## 2023-10-04 ASSESSMENT — ENCOUNTER SYMPTOMS
BLOOD IN STOOL: 0
ABDOMINAL PAIN: 0
NAUSEA: 0
RESPIRATORY NEGATIVE: 1
SINUS PRESSURE: 0
EYE REDNESS: 0
DIARRHEA: 1
CHEST TIGHTNESS: 0
COUGH: 0
EYES NEGATIVE: 1
CONSTIPATION: 0
SHORTNESS OF BREATH: 0
RHINORRHEA: 0
SINUS PAIN: 0
VOMITING: 0
BACK PAIN: 0
EYE PAIN: 0

## 2023-10-05 DIAGNOSIS — E87.1 HYPONATREMIA: ICD-10-CM

## 2023-10-05 DIAGNOSIS — N30.00 ACUTE CYSTITIS WITHOUT HEMATURIA: ICD-10-CM

## 2023-10-05 LAB
ALBUMIN SERPL-MCNC: 3.4 G/DL (ref 3.5–5)
ALBUMIN/GLOB SERPL: 1.1 (ref 1.1–2.2)
ALP SERPL-CCNC: 55 U/L (ref 45–117)
ALT SERPL-CCNC: 22 U/L (ref 12–78)
ANION GAP SERPL CALC-SCNC: 2 MMOL/L (ref 5–15)
APPEARANCE UR: CLEAR
AST SERPL-CCNC: 12 U/L (ref 15–37)
BACTERIA URNS QL MICRO: NEGATIVE /HPF
BASOPHILS # BLD: 0 K/UL (ref 0–0.1)
BASOPHILS NFR BLD: 1 % (ref 0–1)
BILIRUB SERPL-MCNC: 0.5 MG/DL (ref 0.2–1)
BILIRUB UR QL: NEGATIVE
BUN SERPL-MCNC: 12 MG/DL (ref 6–20)
BUN/CREAT SERPL: 17 (ref 12–20)
CALCIUM SERPL-MCNC: 9.5 MG/DL (ref 8.5–10.1)
CHLORIDE SERPL-SCNC: 102 MMOL/L (ref 97–108)
CO2 SERPL-SCNC: 32 MMOL/L (ref 21–32)
COLOR UR: NORMAL
CREAT SERPL-MCNC: 0.71 MG/DL (ref 0.55–1.02)
DIFFERENTIAL METHOD BLD: ABNORMAL
EOSINOPHIL # BLD: 0.1 K/UL (ref 0–0.4)
EOSINOPHIL NFR BLD: 2 % (ref 0–7)
EPITH CASTS URNS QL MICRO: NORMAL /LPF
ERYTHROCYTE [DISTWIDTH] IN BLOOD BY AUTOMATED COUNT: 13 % (ref 11.5–14.5)
GLOBULIN SER CALC-MCNC: 3.2 G/DL (ref 2–4)
GLUCOSE SERPL-MCNC: 94 MG/DL (ref 65–100)
GLUCOSE UR STRIP.AUTO-MCNC: NEGATIVE MG/DL
HCT VFR BLD AUTO: 38.3 % (ref 35–47)
HGB BLD-MCNC: 12.4 G/DL (ref 11.5–16)
HGB UR QL STRIP: NEGATIVE
HYALINE CASTS URNS QL MICRO: NORMAL /LPF (ref 0–5)
IMM GRANULOCYTES # BLD AUTO: 0.1 K/UL (ref 0–0.04)
IMM GRANULOCYTES NFR BLD AUTO: 2 % (ref 0–0.5)
KETONES UR QL STRIP.AUTO: NEGATIVE MG/DL
LEUKOCYTE ESTERASE UR QL STRIP.AUTO: NEGATIVE
LYMPHOCYTES # BLD: 1.1 K/UL (ref 0.8–3.5)
LYMPHOCYTES NFR BLD: 23 % (ref 12–49)
MCH RBC QN AUTO: 30.6 PG (ref 26–34)
MCHC RBC AUTO-ENTMCNC: 32.4 G/DL (ref 30–36.5)
MCV RBC AUTO: 94.6 FL (ref 80–99)
MONOCYTES # BLD: 0.6 K/UL (ref 0–1)
MONOCYTES NFR BLD: 12 % (ref 5–13)
NEUTS SEG # BLD: 2.9 K/UL (ref 1.8–8)
NEUTS SEG NFR BLD: 60 % (ref 32–75)
NITRITE UR QL STRIP.AUTO: NEGATIVE
NRBC # BLD: 0 K/UL (ref 0–0.01)
NRBC BLD-RTO: 0 PER 100 WBC
PH UR STRIP: 7 (ref 5–8)
PLATELET # BLD AUTO: 446 K/UL (ref 150–400)
PMV BLD AUTO: 9 FL (ref 8.9–12.9)
POTASSIUM SERPL-SCNC: 4.3 MMOL/L (ref 3.5–5.1)
PROT SERPL-MCNC: 6.6 G/DL (ref 6.4–8.2)
PROT UR STRIP-MCNC: NEGATIVE MG/DL
RBC # BLD AUTO: 4.05 M/UL (ref 3.8–5.2)
RBC #/AREA URNS HPF: NORMAL /HPF (ref 0–5)
SODIUM SERPL-SCNC: 136 MMOL/L (ref 136–145)
SP GR UR REFRACTOMETRY: <1.005 (ref 1–1.03)
URINE CULTURE IF INDICATED: NORMAL
UROBILINOGEN UR QL STRIP.AUTO: 0.2 EU/DL (ref 0.2–1)
WBC # BLD AUTO: 4.8 K/UL (ref 3.6–11)
WBC URNS QL MICRO: NORMAL /HPF (ref 0–4)

## 2023-11-07 ENCOUNTER — TELEPHONE (OUTPATIENT)
Age: 73
End: 2023-11-07

## 2023-11-07 NOTE — TELEPHONE ENCOUNTER
----- Message from April Shaunna sent at 11/7/2023  1:33 PM EST -----  Subject: Appointment Request    Reason for Call: Established Patient Appointment needed: Routine Medicare   AWV    QUESTIONS    Reason for appointment request? No appointments available during search     Additional Information for Provider? Please call patient back to schedule   her annual wellness visit, she prefers morning appointment for this with   dr. Rick Gutierrez. she is willing to wait for this appointment. patients last   wellness visit was 9/2022 I was unable to find an appointment for patient.    thank you   ---------------------------------------------------------------------------  --------------  Vernon DUKE  [phone]; OK to leave message on voicemail  ---------------------------------------------------------------------------  --------------  SCRIPT ANSWERS

## 2023-11-24 NOTE — TELEPHONE ENCOUNTER
Chief Complaint   Patient presents with    Medication Refill       Requested Prescriptions     Pending Prescriptions Disp Refills    sulindac (CLINORIL) 200 MG tablet [Pharmacy Med Name: SULINDAC 200MG TABS] 30 tablet 0     Sig: TAKE ONE TABLET BY MOUTH ONE TIME DAILY AS NEEDED FOR PAIN       Allergies:  No Known Allergies    Last visit with clinic:  10/4/2023   Next visit with clinic: 1/10/2024     Last visit with this provider: 2/20/2023   Next Visit with this provider: 1/10/2024    Signed by Faisal OWENS  11/24/23  11:08 AM

## 2023-11-26 RX ORDER — SULINDAC 200 MG/1
TABLET ORAL
Qty: 30 TABLET | Refills: 0 | Status: SHIPPED | OUTPATIENT
Start: 2023-11-26

## 2023-12-14 ENCOUNTER — OFFICE VISIT (OUTPATIENT)
Age: 73
End: 2023-12-14

## 2023-12-14 VITALS
BODY MASS INDEX: 27.12 KG/M2 | OXYGEN SATURATION: 98 % | DIASTOLIC BLOOD PRESSURE: 70 MMHG | SYSTOLIC BLOOD PRESSURE: 119 MMHG | HEIGHT: 65 IN | TEMPERATURE: 97.8 F | RESPIRATION RATE: 20 BRPM | HEART RATE: 65 BPM | WEIGHT: 162.8 LBS

## 2023-12-14 DIAGNOSIS — L02.211 ABSCESS OF SKIN OF ABDOMEN: Primary | ICD-10-CM

## 2023-12-14 RX ORDER — CLINDAMYCIN HYDROCHLORIDE 300 MG/1
300 CAPSULE ORAL 3 TIMES DAILY
Qty: 21 CAPSULE | Refills: 0 | Status: SHIPPED | OUTPATIENT
Start: 2023-12-14 | End: 2023-12-21

## 2023-12-14 NOTE — PROGRESS NOTES
and treatment plan with the patient. The patient also understands that early in the process of an illness, an urgent care workup can be falsely reassuring. Routine discharge counseling and specific return precautions discussed with patient and the patient understands that worsening, changing or persistent symptoms should prompt an immediate return to the urgent care or emergency department. Patient/Guardian expressed understanding and agrees with the discharge plan. No further questions at time of discharge.     Jermaine Martinez, MARGE - CNP

## 2024-01-08 SDOH — HEALTH STABILITY: PHYSICAL HEALTH: ON AVERAGE, HOW MANY DAYS PER WEEK DO YOU ENGAGE IN MODERATE TO STRENUOUS EXERCISE (LIKE A BRISK WALK)?: 3 DAYS

## 2024-01-08 SDOH — HEALTH STABILITY: PHYSICAL HEALTH: ON AVERAGE, HOW MANY MINUTES DO YOU ENGAGE IN EXERCISE AT THIS LEVEL?: 50 MIN

## 2024-01-08 ASSESSMENT — LIFESTYLE VARIABLES
HOW MANY STANDARD DRINKS CONTAINING ALCOHOL DO YOU HAVE ON A TYPICAL DAY: 1
HOW MANY STANDARD DRINKS CONTAINING ALCOHOL DO YOU HAVE ON A TYPICAL DAY: 1 OR 2
HOW OFTEN DO YOU HAVE SIX OR MORE DRINKS ON ONE OCCASION: 1
HOW OFTEN DO YOU HAVE A DRINK CONTAINING ALCOHOL: 1
HOW OFTEN DO YOU HAVE A DRINK CONTAINING ALCOHOL: NEVER

## 2024-01-08 ASSESSMENT — PATIENT HEALTH QUESTIONNAIRE - PHQ9
SUM OF ALL RESPONSES TO PHQ QUESTIONS 1-9: 0
2. FEELING DOWN, DEPRESSED OR HOPELESS: 0
SUM OF ALL RESPONSES TO PHQ QUESTIONS 1-9: 0
SUM OF ALL RESPONSES TO PHQ QUESTIONS 1-9: 0
SUM OF ALL RESPONSES TO PHQ9 QUESTIONS 1 & 2: 0
SUM OF ALL RESPONSES TO PHQ QUESTIONS 1-9: 0
1. LITTLE INTEREST OR PLEASURE IN DOING THINGS: 0

## 2024-01-10 ENCOUNTER — OFFICE VISIT (OUTPATIENT)
Age: 74
End: 2024-01-10
Payer: MEDICARE

## 2024-01-10 VITALS
OXYGEN SATURATION: 98 % | BODY MASS INDEX: 27.02 KG/M2 | WEIGHT: 162.2 LBS | HEART RATE: 57 BPM | TEMPERATURE: 98.1 F | DIASTOLIC BLOOD PRESSURE: 83 MMHG | HEIGHT: 65 IN | SYSTOLIC BLOOD PRESSURE: 128 MMHG | RESPIRATION RATE: 16 BRPM

## 2024-01-10 DIAGNOSIS — Z12.31 BREAST CANCER SCREENING BY MAMMOGRAM: ICD-10-CM

## 2024-01-10 DIAGNOSIS — Z00.00 MEDICARE ANNUAL WELLNESS VISIT, SUBSEQUENT: ICD-10-CM

## 2024-01-10 DIAGNOSIS — M47.816 SPONDYLOSIS OF LUMBAR REGION WITHOUT MYELOPATHY OR RADICULOPATHY: ICD-10-CM

## 2024-01-10 DIAGNOSIS — K51.90 ULCERATIVE COLITIS WITHOUT COMPLICATIONS, UNSPECIFIED LOCATION (HCC): Primary | ICD-10-CM

## 2024-01-10 DIAGNOSIS — L72.9 SKIN CYST: ICD-10-CM

## 2024-01-10 PROCEDURE — G8419 CALC BMI OUT NRM PARAM NOF/U: HCPCS | Performed by: INTERNAL MEDICINE

## 2024-01-10 PROCEDURE — G0439 PPPS, SUBSEQ VISIT: HCPCS | Performed by: INTERNAL MEDICINE

## 2024-01-10 PROCEDURE — 99213 OFFICE O/P EST LOW 20 MIN: CPT | Performed by: INTERNAL MEDICINE

## 2024-01-10 PROCEDURE — 1123F ACP DISCUSS/DSCN MKR DOCD: CPT | Performed by: INTERNAL MEDICINE

## 2024-01-10 PROCEDURE — G8399 PT W/DXA RESULTS DOCUMENT: HCPCS | Performed by: INTERNAL MEDICINE

## 2024-01-10 PROCEDURE — 3017F COLORECTAL CA SCREEN DOC REV: CPT | Performed by: INTERNAL MEDICINE

## 2024-01-10 PROCEDURE — 1036F TOBACCO NON-USER: CPT | Performed by: INTERNAL MEDICINE

## 2024-01-10 PROCEDURE — G8484 FLU IMMUNIZE NO ADMIN: HCPCS | Performed by: INTERNAL MEDICINE

## 2024-01-10 PROCEDURE — G8427 DOCREV CUR MEDS BY ELIG CLIN: HCPCS | Performed by: INTERNAL MEDICINE

## 2024-01-10 PROCEDURE — 1090F PRES/ABSN URINE INCON ASSESS: CPT | Performed by: INTERNAL MEDICINE

## 2024-01-10 SDOH — ECONOMIC STABILITY: INCOME INSECURITY: HOW HARD IS IT FOR YOU TO PAY FOR THE VERY BASICS LIKE FOOD, HOUSING, MEDICAL CARE, AND HEATING?: NOT HARD AT ALL

## 2024-01-10 SDOH — ECONOMIC STABILITY: FOOD INSECURITY: WITHIN THE PAST 12 MONTHS, YOU WORRIED THAT YOUR FOOD WOULD RUN OUT BEFORE YOU GOT MONEY TO BUY MORE.: NEVER TRUE

## 2024-01-10 SDOH — ECONOMIC STABILITY: FOOD INSECURITY: WITHIN THE PAST 12 MONTHS, THE FOOD YOU BOUGHT JUST DIDN'T LAST AND YOU DIDN'T HAVE MONEY TO GET MORE.: NEVER TRUE

## 2024-01-10 NOTE — PROGRESS NOTES
Dinorah Victoria (:  1950) is a 73 y.o. female,Established patient, here for evaluation of the following chief complaint(s):  Medicare AWV         ASSESSMENT/PLAN:  1. Medicare annual wellness visit, subsequent  2. Ulcerative colitis without complications, unspecified location (HCC)-no recent concerns and normal colonoscopy in 2018 dr bill  3. Skin cyst-sp resection and healing well  4. Spondylosis of lumbar region without myelopathy or radiculopathy-using prn clinoril rarely   Continue with walking regularly  5. Breast cancer screening by mammogram  -     [facility] DIGITAL SCREEN BILATERAL; Future      No follow-ups on file.         Subjective   SUBJECTIVE/OBJECTIVE:  HPI  Seen for Medicare wellness visit follow-up on recent concerns.  She had a skin cyst right lower abdomen which was growing in size and got to the size of a lemon she ultimately had it resected with Dr. Canales plastic surgery at the beginning of this month she has done well and is not having any fevers chills or pain in the area.    Uses Clinoril for joint pains perhaps twice a month denies GI upset with it.    Mood using fluoxetine 10 mg every other day and mood has been stable.    Preventive care colonoscopy roughly 2 years ago was normal no sign of recent colitis no bloody stools.  Bone density done in  mammogram is due and this is ordered discussed flu and COVID boosters    Social history no tobacco no alcohol she is walking for exercise  Review of Systems       Objective   Physical Exam  Constitutional:       Appearance: Normal appearance.   HENT:      Head: Normocephalic and atraumatic.      Right Ear: Tympanic membrane normal.      Left Ear: Tympanic membrane normal.   Cardiovascular:      Rate and Rhythm: Normal rate and regular rhythm.   Pulmonary:      Effort: Pulmonary effort is normal.      Breath sounds: Normal breath sounds.   Abdominal:      Palpations: Abdomen is soft. There is no mass.      Tenderness: There is no

## 2024-02-05 ENCOUNTER — HOSPITAL ENCOUNTER (OUTPATIENT)
Facility: HOSPITAL | Age: 74
Discharge: HOME | End: 2024-02-08
Attending: INTERNAL MEDICINE
Payer: MEDICARE

## 2024-02-05 VITALS — WEIGHT: 162 LBS | HEIGHT: 65 IN | BODY MASS INDEX: 26.99 KG/M2

## 2024-02-05 DIAGNOSIS — Z12.31 BREAST CANCER SCREENING BY MAMMOGRAM: ICD-10-CM

## 2024-02-05 PROCEDURE — 77063 BREAST TOMOSYNTHESIS BI: CPT

## 2024-02-26 RX ORDER — FLUOXETINE 10 MG/1
CAPSULE ORAL
Qty: 45 CAPSULE | Refills: 2 | Status: SHIPPED | OUTPATIENT
Start: 2024-02-26

## 2024-02-26 NOTE — TELEPHONE ENCOUNTER
PCP: Svitlana Arango MD    Last appt: 1/10/2024   No future appointments.    Requested Prescriptions     Pending Prescriptions Disp Refills    FLUoxetine (PROZAC) 10 MG capsule [Pharmacy Med Name: FLUOXETINE HCL 10MG CAPS] 45 capsule 2     Sig: TAKE ONE CAPSULE BY MOUTH EVERY OTHER DAY

## 2024-08-12 ENCOUNTER — OFFICE VISIT (OUTPATIENT)
Age: 74
End: 2024-08-12
Payer: MEDICARE

## 2024-08-12 VITALS
SYSTOLIC BLOOD PRESSURE: 105 MMHG | DIASTOLIC BLOOD PRESSURE: 64 MMHG | RESPIRATION RATE: 16 BRPM | TEMPERATURE: 98.2 F | HEART RATE: 68 BPM | HEIGHT: 65 IN | OXYGEN SATURATION: 95 % | BODY MASS INDEX: 27.16 KG/M2 | WEIGHT: 163 LBS

## 2024-08-12 DIAGNOSIS — Z71.84 TRAVEL ADVICE ENCOUNTER: ICD-10-CM

## 2024-08-12 DIAGNOSIS — M17.0 BILATERAL PRIMARY OSTEOARTHRITIS OF KNEE: Primary | ICD-10-CM

## 2024-08-12 DIAGNOSIS — K51.90 ULCERATIVE COLITIS WITHOUT COMPLICATIONS, UNSPECIFIED LOCATION (HCC): ICD-10-CM

## 2024-08-12 DIAGNOSIS — F34.1 DYSTHYMIC DISORDER: ICD-10-CM

## 2024-08-12 DIAGNOSIS — M47.816 SPONDYLOSIS OF LUMBAR REGION WITHOUT MYELOPATHY OR RADICULOPATHY: ICD-10-CM

## 2024-08-12 LAB
ALBUMIN SERPL-MCNC: 3.7 G/DL (ref 3.5–5)
ALBUMIN/GLOB SERPL: 1.1 (ref 1.1–2.2)
ALP SERPL-CCNC: 69 U/L (ref 45–117)
ALT SERPL-CCNC: 20 U/L (ref 12–78)
ANION GAP SERPL CALC-SCNC: 5 MMOL/L (ref 5–15)
AST SERPL-CCNC: 12 U/L (ref 15–37)
BASOPHILS # BLD: 0 K/UL (ref 0–0.1)
BASOPHILS NFR BLD: 0 % (ref 0–1)
BILIRUB SERPL-MCNC: 0.4 MG/DL (ref 0.2–1)
BUN SERPL-MCNC: 12 MG/DL (ref 6–20)
BUN/CREAT SERPL: 15 (ref 12–20)
CALCIUM SERPL-MCNC: 9.5 MG/DL (ref 8.5–10.1)
CHLORIDE SERPL-SCNC: 98 MMOL/L (ref 97–108)
CO2 SERPL-SCNC: 30 MMOL/L (ref 21–32)
CREAT SERPL-MCNC: 0.8 MG/DL (ref 0.55–1.02)
DIFFERENTIAL METHOD BLD: NORMAL
EOSINOPHIL # BLD: 0 K/UL (ref 0–0.4)
EOSINOPHIL NFR BLD: 1 % (ref 0–7)
ERYTHROCYTE [DISTWIDTH] IN BLOOD BY AUTOMATED COUNT: 13 % (ref 11.5–14.5)
GLOBULIN SER CALC-MCNC: 3.3 G/DL (ref 2–4)
GLUCOSE SERPL-MCNC: 125 MG/DL (ref 65–100)
HCT VFR BLD AUTO: 38.4 % (ref 35–47)
HGB BLD-MCNC: 12.6 G/DL (ref 11.5–16)
IMM GRANULOCYTES # BLD AUTO: 0 K/UL (ref 0–0.04)
IMM GRANULOCYTES NFR BLD AUTO: 0 % (ref 0–0.5)
LYMPHOCYTES # BLD: 1.2 K/UL (ref 0.8–3.5)
LYMPHOCYTES NFR BLD: 23 % (ref 12–49)
MCH RBC QN AUTO: 31.3 PG (ref 26–34)
MCHC RBC AUTO-ENTMCNC: 32.8 G/DL (ref 30–36.5)
MCV RBC AUTO: 95.3 FL (ref 80–99)
MONOCYTES # BLD: 0.5 K/UL (ref 0–1)
MONOCYTES NFR BLD: 9 % (ref 5–13)
NEUTS SEG # BLD: 3.4 K/UL (ref 1.8–8)
NEUTS SEG NFR BLD: 67 % (ref 32–75)
NRBC # BLD: 0 K/UL (ref 0–0.01)
NRBC BLD-RTO: 0 PER 100 WBC
PLATELET # BLD AUTO: 301 K/UL (ref 150–400)
PMV BLD AUTO: 9.1 FL (ref 8.9–12.9)
POTASSIUM SERPL-SCNC: 4.1 MMOL/L (ref 3.5–5.1)
PROT SERPL-MCNC: 7 G/DL (ref 6.4–8.2)
RBC # BLD AUTO: 4.03 M/UL (ref 3.8–5.2)
SODIUM SERPL-SCNC: 133 MMOL/L (ref 136–145)
WBC # BLD AUTO: 5.1 K/UL (ref 3.6–11)

## 2024-08-12 PROCEDURE — 1036F TOBACCO NON-USER: CPT | Performed by: INTERNAL MEDICINE

## 2024-08-12 PROCEDURE — G8428 CUR MEDS NOT DOCUMENT: HCPCS | Performed by: INTERNAL MEDICINE

## 2024-08-12 PROCEDURE — 99214 OFFICE O/P EST MOD 30 MIN: CPT | Performed by: INTERNAL MEDICINE

## 2024-08-12 PROCEDURE — 1090F PRES/ABSN URINE INCON ASSESS: CPT | Performed by: INTERNAL MEDICINE

## 2024-08-12 PROCEDURE — 3017F COLORECTAL CA SCREEN DOC REV: CPT | Performed by: INTERNAL MEDICINE

## 2024-08-12 PROCEDURE — 90636 HEP A/HEP B VACC ADULT IM: CPT | Performed by: INTERNAL MEDICINE

## 2024-08-12 PROCEDURE — G8399 PT W/DXA RESULTS DOCUMENT: HCPCS | Performed by: INTERNAL MEDICINE

## 2024-08-12 PROCEDURE — 1123F ACP DISCUSS/DSCN MKR DOCD: CPT | Performed by: INTERNAL MEDICINE

## 2024-08-12 PROCEDURE — G8419 CALC BMI OUT NRM PARAM NOF/U: HCPCS | Performed by: INTERNAL MEDICINE

## 2024-08-12 PROCEDURE — PBSHW HEP A-HEP B, TWINRIX, (AGE 18 YRS+), IM: Performed by: INTERNAL MEDICINE

## 2024-08-12 RX ORDER — MELOXICAM 7.5 MG/1
7.5 TABLET ORAL DAILY PRN
Qty: 30 TABLET | Refills: 5 | Status: SHIPPED | OUTPATIENT
Start: 2024-08-12

## 2024-08-12 NOTE — ASSESSMENT & PLAN NOTE
Currently back is not too bothersome but having trouble with both knees.  Discussed meloxicam which Ortho had provided discussed do not combine this with sulindac.

## 2024-08-12 NOTE — PROGRESS NOTES
Dinorah Victoria (:  1950) is a 73 y.o. female,Established patient, here for evaluation of the following chief complaint(s):  Medication Check (Medication follow up)         Assessment & Plan  Spondylosis of lumbar region without myelopathy or radiculopathy  Currently back is not too bothersome but having trouble with both knees.  Discussed meloxicam which Ortho had provided discussed do not combine this with sulindac.         Dysthymic disorder            Ulcerative colitis without complications, unspecified location (HCC)   No  recent flarese    Orders:  •  CBC with Auto Differential; Future  •  Comprehensive Metabolic Panel; Future    Travel advice encounter  Going to [place] in October interested in vaccines will start Twinrix series today prescription for typhoid provided.  Tdap done within the last 2 years.    Orders:  •  Hep A-Hep B, TWINRIX, (age 18 yrs+), IM    Bilateral primary osteoarthritis of knee  This is her main concern.  Has been to orthopedics and had meloxicam for 4 months and felt it was very helpful.  Orthopedic doctor will no longer provide this med.  She does not have GI side effects from it.  Discussed the long-term risks which are primarily GI and renal.  Encouraged to use it every other day and then tried a minimize use.  Will check chemistries today    Orders:  •  meloxicam (MOBIC) 7.5 MG tablet; Take 1 tablet by mouth daily as needed for Pain      No follow-ups on file.       Subjective   HPI    Review of Systems       Objective   Physical Exam  Constitutional:       Appearance: Normal appearance.   HENT:      Head: Normocephalic and atraumatic.   Cardiovascular:      Rate and Rhythm: Normal rate and regular rhythm.   Pulmonary:      Effort: Pulmonary effort is normal.      Breath sounds: Normal breath sounds.   Musculoskeletal:      Right lower leg: No edema.      Left lower leg: No edema.      Comments: Oa changes of both knees with mild effusion not red or warm and good rom

## 2024-08-12 NOTE — PROGRESS NOTES
Patient present for routine immunizations.   Pt denies any symptoms , reactions or allergies that would exclude them from being immunized today.  Risks and adverse reactions were discussed and the VIS was given to them. All questions were addressed.  Pt was observed for 10 min post injection. There were no reactions observed.    MIRA SNOW RN

## 2024-08-12 NOTE — ASSESSMENT & PLAN NOTE
No  recent flarese    Orders:    CBC with Auto Differential; Future    Comprehensive Metabolic Panel; Future

## 2024-09-17 ENCOUNTER — OFFICE VISIT (OUTPATIENT)
Age: 74
End: 2024-09-17
Payer: MEDICARE

## 2024-09-17 VITALS
WEIGHT: 165.4 LBS | HEART RATE: 64 BPM | DIASTOLIC BLOOD PRESSURE: 72 MMHG | TEMPERATURE: 97.9 F | HEIGHT: 65 IN | OXYGEN SATURATION: 97 % | SYSTOLIC BLOOD PRESSURE: 132 MMHG | RESPIRATION RATE: 16 BRPM | BODY MASS INDEX: 27.56 KG/M2

## 2024-09-17 DIAGNOSIS — Z71.84 TRAVEL ADVICE ENCOUNTER: Primary | ICD-10-CM

## 2024-09-17 DIAGNOSIS — E87.1 HYPONATREMIA: ICD-10-CM

## 2024-09-17 PROCEDURE — 90636 HEP A/HEP B VACC ADULT IM: CPT | Performed by: INTERNAL MEDICINE

## 2024-09-17 PROCEDURE — 1123F ACP DISCUSS/DSCN MKR DOCD: CPT | Performed by: INTERNAL MEDICINE

## 2024-09-17 PROCEDURE — 99213 OFFICE O/P EST LOW 20 MIN: CPT | Performed by: INTERNAL MEDICINE

## 2024-09-17 PROCEDURE — G8427 DOCREV CUR MEDS BY ELIG CLIN: HCPCS | Performed by: INTERNAL MEDICINE

## 2024-09-17 PROCEDURE — G8419 CALC BMI OUT NRM PARAM NOF/U: HCPCS | Performed by: INTERNAL MEDICINE

## 2024-09-17 PROCEDURE — 1036F TOBACCO NON-USER: CPT | Performed by: INTERNAL MEDICINE

## 2024-09-17 PROCEDURE — 1090F PRES/ABSN URINE INCON ASSESS: CPT | Performed by: INTERNAL MEDICINE

## 2024-09-17 PROCEDURE — PBSHW HEP A-HEP B, TWINRIX, (AGE 18 YRS+), IM: Performed by: INTERNAL MEDICINE

## 2024-09-17 PROCEDURE — 3017F COLORECTAL CA SCREEN DOC REV: CPT | Performed by: INTERNAL MEDICINE

## 2024-09-17 PROCEDURE — G8399 PT W/DXA RESULTS DOCUMENT: HCPCS | Performed by: INTERNAL MEDICINE

## 2024-10-13 RX ORDER — FLUOXETINE 10 MG/1
CAPSULE ORAL
Qty: 45 CAPSULE | Refills: 2 | Status: SHIPPED | OUTPATIENT
Start: 2024-10-13

## 2025-03-13 ENCOUNTER — TELEPHONE (OUTPATIENT)
Facility: CLINIC | Age: 75
End: 2025-03-13

## 2025-03-13 NOTE — TELEPHONE ENCOUNTER
Attempted to contact patient regarding upcoming Medicare wellness appointment and completion of HRA questionnaire. LVM for patient to please return call at  [phone]

## 2025-03-14 ENCOUNTER — TELEPHONE (OUTPATIENT)
Facility: CLINIC | Age: 75
End: 2025-03-14

## 2025-03-14 SDOH — HEALTH STABILITY: PHYSICAL HEALTH: ON AVERAGE, HOW MANY MINUTES DO YOU ENGAGE IN EXERCISE AT THIS LEVEL?: 50 MIN

## 2025-03-14 SDOH — ECONOMIC STABILITY: FOOD INSECURITY: WITHIN THE PAST 12 MONTHS, YOU WORRIED THAT YOUR FOOD WOULD RUN OUT BEFORE YOU GOT MONEY TO BUY MORE.: NEVER TRUE

## 2025-03-14 SDOH — HEALTH STABILITY: PHYSICAL HEALTH: ON AVERAGE, HOW MANY DAYS PER WEEK DO YOU ENGAGE IN MODERATE TO STRENUOUS EXERCISE (LIKE A BRISK WALK)?: 4 DAYS

## 2025-03-14 SDOH — ECONOMIC STABILITY: FOOD INSECURITY: WITHIN THE PAST 12 MONTHS, THE FOOD YOU BOUGHT JUST DIDN'T LAST AND YOU DIDN'T HAVE MONEY TO GET MORE.: NEVER TRUE

## 2025-03-14 SDOH — ECONOMIC STABILITY: INCOME INSECURITY: IN THE LAST 12 MONTHS, WAS THERE A TIME WHEN YOU WERE NOT ABLE TO PAY THE MORTGAGE OR RENT ON TIME?: NO

## 2025-03-14 SDOH — ECONOMIC STABILITY: TRANSPORTATION INSECURITY
IN THE PAST 12 MONTHS, HAS THE LACK OF TRANSPORTATION KEPT YOU FROM MEDICAL APPOINTMENTS OR FROM GETTING MEDICATIONS?: NO

## 2025-03-14 ASSESSMENT — LIFESTYLE VARIABLES
HOW OFTEN DO YOU HAVE A DRINK CONTAINING ALCOHOL: 2-3 TIMES A WEEK
HOW MANY STANDARD DRINKS CONTAINING ALCOHOL DO YOU HAVE ON A TYPICAL DAY: 1 OR 2
HOW MANY STANDARD DRINKS CONTAINING ALCOHOL DO YOU HAVE ON A TYPICAL DAY: 1
HOW OFTEN DO YOU HAVE A DRINK CONTAINING ALCOHOL: 4
HOW OFTEN DO YOU HAVE SIX OR MORE DRINKS ON ONE OCCASION: 1

## 2025-03-14 ASSESSMENT — PATIENT HEALTH QUESTIONNAIRE - PHQ9
SUM OF ALL RESPONSES TO PHQ QUESTIONS 1-9: 0
SUM OF ALL RESPONSES TO PHQ QUESTIONS 1-9: 0
2. FEELING DOWN, DEPRESSED OR HOPELESS: NOT AT ALL
SUM OF ALL RESPONSES TO PHQ QUESTIONS 1-9: 0
1. LITTLE INTEREST OR PLEASURE IN DOING THINGS: NOT AT ALL
SUM OF ALL RESPONSES TO PHQ QUESTIONS 1-9: 0

## 2025-03-17 ENCOUNTER — OFFICE VISIT (OUTPATIENT)
Facility: CLINIC | Age: 75
End: 2025-03-17
Payer: MEDICARE

## 2025-03-17 VITALS
DIASTOLIC BLOOD PRESSURE: 78 MMHG | BODY MASS INDEX: 27.49 KG/M2 | WEIGHT: 165 LBS | HEIGHT: 65 IN | RESPIRATION RATE: 16 BRPM | SYSTOLIC BLOOD PRESSURE: 131 MMHG | TEMPERATURE: 97.7 F | OXYGEN SATURATION: 99 % | HEART RATE: 68 BPM

## 2025-03-17 DIAGNOSIS — E87.1 HYPONATREMIA: ICD-10-CM

## 2025-03-17 DIAGNOSIS — R55 SYNCOPE, UNSPECIFIED SYNCOPE TYPE: ICD-10-CM

## 2025-03-17 DIAGNOSIS — Z23 NEED FOR IMMUNIZATION AGAINST VIRAL HEPATITIS: ICD-10-CM

## 2025-03-17 DIAGNOSIS — F34.1 DYSTHYMIA: ICD-10-CM

## 2025-03-17 DIAGNOSIS — Z00.00 MEDICARE ANNUAL WELLNESS VISIT, SUBSEQUENT: Primary | ICD-10-CM

## 2025-03-17 DIAGNOSIS — K51.90 ULCERATIVE COLITIS WITHOUT COMPLICATIONS, UNSPECIFIED LOCATION (HCC): ICD-10-CM

## 2025-03-17 PROCEDURE — 99214 OFFICE O/P EST MOD 30 MIN: CPT | Performed by: INTERNAL MEDICINE

## 2025-03-17 PROCEDURE — G0439 PPPS, SUBSEQ VISIT: HCPCS | Performed by: INTERNAL MEDICINE

## 2025-03-17 PROCEDURE — 1160F RVW MEDS BY RX/DR IN RCRD: CPT | Performed by: INTERNAL MEDICINE

## 2025-03-17 PROCEDURE — 1123F ACP DISCUSS/DSCN MKR DOCD: CPT | Performed by: INTERNAL MEDICINE

## 2025-03-17 PROCEDURE — 1036F TOBACCO NON-USER: CPT | Performed by: INTERNAL MEDICINE

## 2025-03-17 PROCEDURE — G8419 CALC BMI OUT NRM PARAM NOF/U: HCPCS | Performed by: INTERNAL MEDICINE

## 2025-03-17 PROCEDURE — 3017F COLORECTAL CA SCREEN DOC REV: CPT | Performed by: INTERNAL MEDICINE

## 2025-03-17 PROCEDURE — 90636 HEP A/HEP B VACC ADULT IM: CPT | Performed by: INTERNAL MEDICINE

## 2025-03-17 PROCEDURE — 1090F PRES/ABSN URINE INCON ASSESS: CPT | Performed by: INTERNAL MEDICINE

## 2025-03-17 PROCEDURE — PBSHW HEP A-HEP B, TWINRIX, (AGE 18 YRS+), IM: Performed by: INTERNAL MEDICINE

## 2025-03-17 PROCEDURE — 1126F AMNT PAIN NOTED NONE PRSNT: CPT | Performed by: INTERNAL MEDICINE

## 2025-03-17 PROCEDURE — 1159F MED LIST DOCD IN RCRD: CPT | Performed by: INTERNAL MEDICINE

## 2025-03-17 PROCEDURE — G8399 PT W/DXA RESULTS DOCUMENT: HCPCS | Performed by: INTERNAL MEDICINE

## 2025-03-17 PROCEDURE — G8427 DOCREV CUR MEDS BY ELIG CLIN: HCPCS | Performed by: INTERNAL MEDICINE

## 2025-03-17 SDOH — ECONOMIC STABILITY: FOOD INSECURITY: WITHIN THE PAST 12 MONTHS, YOU WORRIED THAT YOUR FOOD WOULD RUN OUT BEFORE YOU GOT MONEY TO BUY MORE.: NEVER TRUE

## 2025-03-17 SDOH — ECONOMIC STABILITY: FOOD INSECURITY: WITHIN THE PAST 12 MONTHS, THE FOOD YOU BOUGHT JUST DIDN'T LAST AND YOU DIDN'T HAVE MONEY TO GET MORE.: NEVER TRUE

## 2025-03-17 ASSESSMENT — PATIENT HEALTH QUESTIONNAIRE - PHQ9
6. FEELING BAD ABOUT YOURSELF - OR THAT YOU ARE A FAILURE OR HAVE LET YOURSELF OR YOUR FAMILY DOWN: NOT AT ALL
SUM OF ALL RESPONSES TO PHQ QUESTIONS 1-9: 0
SUM OF ALL RESPONSES TO PHQ QUESTIONS 1-9: 0
3. TROUBLE FALLING OR STAYING ASLEEP: NOT AT ALL
SUM OF ALL RESPONSES TO PHQ QUESTIONS 1-9: 0
8. MOVING OR SPEAKING SO SLOWLY THAT OTHER PEOPLE COULD HAVE NOTICED. OR THE OPPOSITE, BEING SO FIGETY OR RESTLESS THAT YOU HAVE BEEN MOVING AROUND A LOT MORE THAN USUAL: NOT AT ALL
2. FEELING DOWN, DEPRESSED OR HOPELESS: NOT AT ALL
7. TROUBLE CONCENTRATING ON THINGS, SUCH AS READING THE NEWSPAPER OR WATCHING TELEVISION: NOT AT ALL
1. LITTLE INTEREST OR PLEASURE IN DOING THINGS: NOT AT ALL
4. FEELING TIRED OR HAVING LITTLE ENERGY: NOT AT ALL
5. POOR APPETITE OR OVEREATING: NOT AT ALL
SUM OF ALL RESPONSES TO PHQ QUESTIONS 1-9: 0
9. THOUGHTS THAT YOU WOULD BE BETTER OFF DEAD, OR OF HURTING YOURSELF: NOT AT ALL

## 2025-03-17 NOTE — PROGRESS NOTES
Dinorah Victoria (:  1950) is a 74 y.o. female,Established patient, here for evaluation of the following chief complaint(s):  Medicare AWV (AWV)         Assessment & Plan  Medicare annual wellness visit, subsequent  Encouraged annual mammogram.  Encouraged ENT eval because of trouble with her current hearing aids.  Encouraged ophthalmology visit.  Discussed limiting alcohol to 2 glasses a week         Ulcerative colitis without complications, unspecified location (HCC)  No recent symptoms         Dysthymia  Continue fluoxetine 10 mg qod         Need for immunization against viral hepatitis       Orders:    Hep A-Hep B, [place], (age 18 yrs+), IM    Hyponatremia  Recheck sodium levels has been stable around 130    Orders:    Comprehensive Metabolic Panel; Future    TSH; Future    Syncope, unspecified syncope type  1 episode about a month ago out of town with friends.  Had eaten and had a glass of wine.  Had a witnessed episode.  No seizure-like activity.  Still lasted for approximately 20 minutes.  Discussed evaluation including MRI to rule out structural brain issues.  Event monitor to rule out arrhythmias.  She declines the monitor but would like to see cardiology    Orders:    MRI BRAIN W WO CONTRAST; Future    [facility] - Yana Snyder MD, Cardiology, [place] ([address] )    CBC with Auto Differential; Future      No follow-ups on file.       Subjective   HPI  Seen for well visit but wants to discuss a spell she had roughly a month ago.  She was out of town in a hotel with friends.  Had had a full dinner and 1 glass of wine.  Had acute onset of syncope.  Friends reported that her head dropped she was unable to walk on her own and they had to assist her into a bedroom.  She was not incontinent.  She did not have overt seizure-like activity.  She did not have headache.  She did not have palpitations or chest pain.  She denies any other episodes.  We discussed evaluation.  She is concerned about cost of

## 2025-03-18 ENCOUNTER — RESULTS FOLLOW-UP (OUTPATIENT)
Facility: CLINIC | Age: 75
End: 2025-03-18

## 2025-03-18 LAB
ALBUMIN SERPL-MCNC: 3.8 G/DL (ref 3.5–5)
ALBUMIN/GLOB SERPL: 1.2 (ref 1.1–2.2)
ALP SERPL-CCNC: 70 U/L (ref 45–117)
ALT SERPL-CCNC: 17 U/L (ref 12–78)
ANION GAP SERPL CALC-SCNC: 7 MMOL/L (ref 2–12)
AST SERPL-CCNC: 18 U/L (ref 15–37)
BASOPHILS # BLD: 0.03 K/UL (ref 0–0.1)
BASOPHILS NFR BLD: 0.6 % (ref 0–1)
BILIRUB SERPL-MCNC: 0.4 MG/DL (ref 0.2–1)
BUN SERPL-MCNC: 12 MG/DL (ref 6–20)
BUN/CREAT SERPL: 18 (ref 12–20)
CALCIUM SERPL-MCNC: 9.9 MG/DL (ref 8.5–10.1)
CHLORIDE SERPL-SCNC: 98 MMOL/L (ref 97–108)
CO2 SERPL-SCNC: 28 MMOL/L (ref 21–32)
CREAT SERPL-MCNC: 0.65 MG/DL (ref 0.55–1.02)
DIFFERENTIAL METHOD BLD: NORMAL
EOSINOPHIL # BLD: 0.08 K/UL (ref 0–0.4)
EOSINOPHIL NFR BLD: 1.5 % (ref 0–7)
ERYTHROCYTE [DISTWIDTH] IN BLOOD BY AUTOMATED COUNT: 12.9 % (ref 11.5–14.5)
GLOBULIN SER CALC-MCNC: 3.3 G/DL (ref 2–4)
GLUCOSE SERPL-MCNC: 124 MG/DL (ref 65–100)
HCT VFR BLD AUTO: 41 % (ref 35–47)
HGB BLD-MCNC: 13.5 G/DL (ref 11.5–16)
IMM GRANULOCYTES # BLD AUTO: 0.02 K/UL (ref 0–0.04)
IMM GRANULOCYTES NFR BLD AUTO: 0.4 % (ref 0–0.5)
LYMPHOCYTES # BLD: 1.5 K/UL (ref 0.8–3.5)
LYMPHOCYTES NFR BLD: 27.7 % (ref 12–49)
MCH RBC QN AUTO: 31.4 PG (ref 26–34)
MCHC RBC AUTO-ENTMCNC: 32.9 G/DL (ref 30–36.5)
MCV RBC AUTO: 95.3 FL (ref 80–99)
MONOCYTES # BLD: 0.47 K/UL (ref 0–1)
MONOCYTES NFR BLD: 8.7 % (ref 5–13)
NEUTS SEG # BLD: 3.32 K/UL (ref 1.8–8)
NEUTS SEG NFR BLD: 61.1 % (ref 32–75)
NRBC # BLD: 0 K/UL (ref 0–0.01)
NRBC BLD-RTO: 0 PER 100 WBC
PLATELET # BLD AUTO: 349 K/UL (ref 150–400)
PMV BLD AUTO: 9.1 FL (ref 8.9–12.9)
POTASSIUM SERPL-SCNC: 4.5 MMOL/L (ref 3.5–5.1)
PROT SERPL-MCNC: 7.1 G/DL (ref 6.4–8.2)
RBC # BLD AUTO: 4.3 M/UL (ref 3.8–5.2)
SODIUM SERPL-SCNC: 133 MMOL/L (ref 136–145)
TSH SERPL DL<=0.05 MIU/L-ACNC: 1.47 UIU/ML (ref 0.36–3.74)
WBC # BLD AUTO: 5.4 K/UL (ref 3.6–11)

## 2025-03-28 ENCOUNTER — HOSPITAL ENCOUNTER (OUTPATIENT)
Facility: HOSPITAL | Age: 75
Discharge: HOME | End: 2025-03-31
Attending: INTERNAL MEDICINE
Payer: MEDICARE

## 2025-03-28 VITALS — BODY MASS INDEX: 27.46 KG/M2 | WEIGHT: 165 LBS

## 2025-03-28 DIAGNOSIS — R55 SYNCOPE, UNSPECIFIED SYNCOPE TYPE: ICD-10-CM

## 2025-03-28 PROCEDURE — A9579 GAD-BASE MR CONTRAST NOS,1ML: HCPCS | Performed by: RADIOLOGY

## 2025-03-28 PROCEDURE — 6360000004 HC RX CONTRAST MEDICATION: Performed by: RADIOLOGY

## 2025-03-28 PROCEDURE — 70553 MRI BRAIN STEM W/O & W/DYE: CPT

## 2025-03-28 RX ADMIN — GADOTERIDOL 14 ML: 279.3 INJECTION, SOLUTION INTRAVENOUS at 07:11

## 2025-03-29 ENCOUNTER — RESULTS FOLLOW-UP (OUTPATIENT)
Facility: CLINIC | Age: 75
End: 2025-03-29

## 2025-05-01 ENCOUNTER — OFFICE VISIT (OUTPATIENT)
Age: 75
End: 2025-05-01
Payer: MEDICARE

## 2025-05-01 VITALS
DIASTOLIC BLOOD PRESSURE: 60 MMHG | HEART RATE: 64 BPM | HEIGHT: 65 IN | WEIGHT: 168 LBS | SYSTOLIC BLOOD PRESSURE: 118 MMHG | OXYGEN SATURATION: 98 % | BODY MASS INDEX: 27.99 KG/M2

## 2025-05-01 DIAGNOSIS — R55 SYNCOPE, UNSPECIFIED SYNCOPE TYPE: Primary | ICD-10-CM

## 2025-05-01 DIAGNOSIS — Z76.89 ENCOUNTER TO ESTABLISH CARE: ICD-10-CM

## 2025-05-01 DIAGNOSIS — R55 SYNCOPE: ICD-10-CM

## 2025-05-01 PROCEDURE — 1126F AMNT PAIN NOTED NONE PRSNT: CPT | Performed by: SPECIALIST

## 2025-05-01 PROCEDURE — 99204 OFFICE O/P NEW MOD 45 MIN: CPT | Performed by: SPECIALIST

## 2025-05-01 PROCEDURE — 3017F COLORECTAL CA SCREEN DOC REV: CPT | Performed by: SPECIALIST

## 2025-05-01 PROCEDURE — 93010 ELECTROCARDIOGRAM REPORT: CPT | Performed by: SPECIALIST

## 2025-05-01 PROCEDURE — 1123F ACP DISCUSS/DSCN MKR DOCD: CPT | Performed by: SPECIALIST

## 2025-05-01 PROCEDURE — 1159F MED LIST DOCD IN RCRD: CPT | Performed by: SPECIALIST

## 2025-05-01 PROCEDURE — G8399 PT W/DXA RESULTS DOCUMENT: HCPCS | Performed by: SPECIALIST

## 2025-05-01 PROCEDURE — G8427 DOCREV CUR MEDS BY ELIG CLIN: HCPCS | Performed by: SPECIALIST

## 2025-05-01 PROCEDURE — G8419 CALC BMI OUT NRM PARAM NOF/U: HCPCS | Performed by: SPECIALIST

## 2025-05-01 PROCEDURE — 93005 ELECTROCARDIOGRAM TRACING: CPT | Performed by: SPECIALIST

## 2025-05-01 PROCEDURE — 1036F TOBACCO NON-USER: CPT | Performed by: SPECIALIST

## 2025-05-01 PROCEDURE — 1090F PRES/ABSN URINE INCON ASSESS: CPT | Performed by: SPECIALIST

## 2025-05-01 PROCEDURE — 1160F RVW MEDS BY RX/DR IN RCRD: CPT | Performed by: SPECIALIST

## 2025-05-01 NOTE — PROGRESS NOTES
Chief Complaint   Patient presents with    New Patient     syncope     Vitals:    05/01/25 1421   BP: 118/60   BP Site: Left Upper Arm   Patient Position: Sitting   BP Cuff Size: Medium Adult   Pulse: 64   SpO2: 98%   Weight: 76.2 kg (168 lb)   Height: 1.651 m (5' 5\")       Chest pain NO     ER, urgent care, or hospitalized outside of [facility] since your last visit?  NO     Refills NO   
Date     (L) 03/17/2025    K 4.5 03/17/2025    CL 98 03/17/2025    CO2 28 03/17/2025    BUN 12 03/17/2025    CREATININE 0.65 03/17/2025    GLUCOSE 124 (H) 03/17/2025    CALCIUM 9.9 03/17/2025    BILITOT 0.4 03/17/2025    ALKPHOS 70 03/17/2025    AST 18 03/17/2025    ALT 17 03/17/2025    LABGLOM >90 03/17/2025    GFRAA >60 09/20/2022    AGRATIO 1.2 09/20/2022    GLOB 3.3 03/17/2025         Lab Results   Component Value Date    WBC 5.4 03/17/2025    HGB 13.5 03/17/2025    HCT 41.0 03/17/2025    MCV 95.3 03/17/2025     03/17/2025       Lab Results   Component Value Date    CHOL 188 02/05/2021    TRIG 41 02/05/2021    HDL 74 02/05/2021    .8 (H) 02/05/2021    VLDL 8.2 02/05/2021    CHOLHDLRATIO 2.5 02/05/2021       Lab Results   Component Value Date    TSH 1.47 03/17/2025           CARDIAC DIAGNOSTICS:     Cardiac Evaluation Includes:  I reviewed the test results below.    EKG 5/1/25 - NSR, normal       Brain MRI 3/28/25 - no acute abn       ASSESSMENT AND PLAN:     Assessment and Plan:    Syncope   - had syncope on 3/1/25 while sitting   - unclear etiology - maybe vasovagal as she had warning signs but no obvious cause   - Home BP's look normal   - In order to rule out cardiac pathology - Will check an Echo, treadmill stress test, and 2 week Holter   - [facility] post-syncope policy reviewed   - asked her to stay hydrated       See NP in 3 months      (2010) - Retired - [employer] - has one son in [place].  Works out at [employer] 2+ days a week.       Jac Baltazar MD, FACC    [facility]  [facility]  [address], [address]  [address] [address]  [place], [place]  [address]  [place] [address]  Ph: [phone]   Ph [phone]

## 2025-05-02 ENCOUNTER — TELEPHONE (OUTPATIENT)
Age: 75
End: 2025-05-02

## 2025-05-02 NOTE — TELEPHONE ENCOUNTER
----- Message from Dr. Jac Baltazar MD sent at 2025  2:50 PM EDT -----  Regardin week holter  Please send her a 2 week holter for syncope     Thanks

## 2025-05-27 ENCOUNTER — ANCILLARY PROCEDURE (OUTPATIENT)
Age: 75
End: 2025-05-27
Payer: MEDICARE

## 2025-05-27 ENCOUNTER — RESULTS FOLLOW-UP (OUTPATIENT)
Age: 75
End: 2025-05-27

## 2025-05-27 VITALS
WEIGHT: 168 LBS | BODY MASS INDEX: 27.99 KG/M2 | SYSTOLIC BLOOD PRESSURE: 112 MMHG | DIASTOLIC BLOOD PRESSURE: 62 MMHG | HEIGHT: 65 IN | HEART RATE: 55 BPM

## 2025-05-27 VITALS
SYSTOLIC BLOOD PRESSURE: 120 MMHG | BODY MASS INDEX: 27.99 KG/M2 | DIASTOLIC BLOOD PRESSURE: 78 MMHG | HEIGHT: 65 IN | WEIGHT: 168 LBS | HEART RATE: 61 BPM

## 2025-05-27 DIAGNOSIS — R55 SYNCOPE: ICD-10-CM

## 2025-05-27 DIAGNOSIS — R55 SYNCOPE, UNSPECIFIED SYNCOPE TYPE: ICD-10-CM

## 2025-05-27 DIAGNOSIS — Z76.89 ENCOUNTER TO ESTABLISH CARE: ICD-10-CM

## 2025-05-27 LAB
ECHO AO ASC DIAM: 3.1 CM
ECHO AO ASCENDING AORTA INDEX: 1.68 CM/M2
ECHO AO ROOT DIAM: 3.4 CM
ECHO AO ROOT INDEX: 1.85 CM/M2
ECHO AV AREA PEAK VELOCITY: 2.1 CM2
ECHO AV AREA VTI: 2.2 CM2
ECHO AV AREA/BSA PEAK VELOCITY: 1.1 CM2/M2
ECHO AV AREA/BSA VTI: 1.2 CM2/M2
ECHO AV MEAN GRADIENT: 3 MMHG
ECHO AV MEAN VELOCITY: 0.8 M/S
ECHO AV PEAK GRADIENT: 7 MMHG
ECHO AV PEAK VELOCITY: 1.3 M/S
ECHO AV VELOCITY RATIO: 0.69
ECHO AV VTI: 27.9 CM
ECHO BSA: 1.87 M2
ECHO BSA: 1.87 M2
ECHO EST RA PRESSURE: 3 MMHG
ECHO LA DIAMETER INDEX: 2.12 CM/M2
ECHO LA DIAMETER: 3.9 CM
ECHO LA TO AORTIC ROOT RATIO: 1.15
ECHO LA VOL A-L A2C: 49 ML (ref 22–52)
ECHO LA VOL A-L A4C: 49 ML (ref 22–52)
ECHO LA VOL BP: 47 ML (ref 22–52)
ECHO LA VOL MOD A2C: 45 ML (ref 22–52)
ECHO LA VOL MOD A4C: 45 ML (ref 22–52)
ECHO LA VOL/BSA BIPLANE: 26 ML/M2 (ref 16–34)
ECHO LA VOLUME AREA LENGTH: 51 ML
ECHO LA VOLUME INDEX A-L A2C: 27 ML/M2 (ref 16–34)
ECHO LA VOLUME INDEX A-L A4C: 27 ML/M2 (ref 16–34)
ECHO LA VOLUME INDEX AREA LENGTH: 28 ML/M2 (ref 16–34)
ECHO LA VOLUME INDEX MOD A2C: 24 ML/M2 (ref 16–34)
ECHO LA VOLUME INDEX MOD A4C: 24 ML/M2 (ref 16–34)
ECHO LV E' LATERAL VELOCITY: 9.6 CM/S
ECHO LV E' SEPTAL VELOCITY: 7.8 CM/S
ECHO LV EDV A2C: 77 ML
ECHO LV EDV A4C: 100 ML
ECHO LV EDV BP: 90 ML (ref 56–104)
ECHO LV EDV INDEX A4C: 54 ML/M2
ECHO LV EDV INDEX BP: 49 ML/M2
ECHO LV EDV NDEX A2C: 42 ML/M2
ECHO LV EF PHYSICIAN: 60 %
ECHO LV EJECTION FRACTION A2C: 61 %
ECHO LV EJECTION FRACTION A4C: 69 %
ECHO LV ESV A2C: 30 ML
ECHO LV ESV A4C: 31 ML
ECHO LV ESV BP: 31 ML (ref 19–49)
ECHO LV ESV INDEX A2C: 16 ML/M2
ECHO LV ESV INDEX A4C: 17 ML/M2
ECHO LV ESV INDEX BP: 17 ML/M2
ECHO LV FRACTIONAL SHORTENING: 36 % (ref 28–44)
ECHO LV INTERNAL DIMENSION DIASTOLE INDEX: 2.45 CM/M2
ECHO LV INTERNAL DIMENSION DIASTOLIC: 4.5 CM (ref 3.9–5.3)
ECHO LV INTERNAL DIMENSION SYSTOLIC INDEX: 1.58 CM/M2
ECHO LV INTERNAL DIMENSION SYSTOLIC: 2.9 CM
ECHO LV IVSD: 0.8 CM (ref 0.6–0.9)
ECHO LV MASS 2D: 113.6 G (ref 67–162)
ECHO LV MASS INDEX 2D: 61.8 G/M2 (ref 43–95)
ECHO LV POSTERIOR WALL DIASTOLIC: 0.8 CM (ref 0.6–0.9)
ECHO LV RELATIVE WALL THICKNESS RATIO: 0.36
ECHO LVOT AREA: 3.1 CM2
ECHO LVOT AV VTI INDEX: 0.72
ECHO LVOT DIAM: 2 CM
ECHO LVOT MEAN GRADIENT: 2 MMHG
ECHO LVOT PEAK GRADIENT: 3 MMHG
ECHO LVOT PEAK VELOCITY: 0.9 M/S
ECHO LVOT STROKE VOLUME INDEX: 34.3 ML/M2
ECHO LVOT SV: 63.1 ML
ECHO LVOT VTI: 20.1 CM
ECHO MV A VELOCITY: 0.81 M/S
ECHO MV AREA PHT: 3.2 CM2
ECHO MV E DECELERATION TIME (DT): 233.4 MS
ECHO MV E VELOCITY: 0.75 M/S
ECHO MV E/A RATIO: 0.93
ECHO MV E/E' LATERAL: 7.81
ECHO MV E/E' RATIO (AVERAGED): 8.71
ECHO MV E/E' SEPTAL: 9.62
ECHO MV PRESSURE HALF TIME (PHT): 67.7 MS
ECHO RIGHT VENTRICULAR SYSTOLIC PRESSURE (RVSP): 29 MMHG
ECHO RV FREE WALL PEAK S': 13.4 CM/S
ECHO RV INTERNAL DIMENSION: 3.4 CM
ECHO RV TAPSE: 2.1 CM (ref 1.7–?)
ECHO TV REGURGITANT MAX VELOCITY: 2.53 M/S
ECHO TV REGURGITANT PEAK GRADIENT: 26 MMHG
STRESS ANGINA INDEX: 0
STRESS BASELINE DIAS BP: 62 MMHG
STRESS BASELINE HR: 55 BPM
STRESS BASELINE SYS BP: 112 MMHG
STRESS ESTIMATED WORKLOAD: 7.6 METS
STRESS EXERCISE DUR MIN: 6 MIN
STRESS EXERCISE DUR SEC: 33 SEC
STRESS O2 SAT PEAK: 98 %
STRESS O2 SAT REST: 98 %
STRESS PEAK DIAS BP: 80 MMHG
STRESS PEAK SYS BP: 148 MMHG
STRESS PERCENT HR ACHIEVED: 81 %
STRESS POST PEAK HR: 118 BPM
STRESS RATE PRESSURE PRODUCT: NORMAL BPM*MMHG
STRESS TARGET HR: 146 BPM

## 2025-05-27 PROCEDURE — 93016 CV STRESS TEST SUPVJ ONLY: CPT | Performed by: SPECIALIST

## 2025-05-27 PROCEDURE — 93017 CV STRESS TEST TRACING ONLY: CPT | Performed by: SPECIALIST

## 2025-05-27 PROCEDURE — 93018 CV STRESS TEST I&R ONLY: CPT | Performed by: SPECIALIST

## 2025-05-27 PROCEDURE — 93306 TTE W/DOPPLER COMPLETE: CPT | Performed by: SPECIALIST

## 2025-05-30 ENCOUNTER — RESULTS FOLLOW-UP (OUTPATIENT)
Age: 75
End: 2025-05-30

## 2025-07-30 DIAGNOSIS — F34.1 DYSTHYMIA: Primary | ICD-10-CM

## 2025-07-30 RX ORDER — FLUOXETINE 10 MG/1
CAPSULE ORAL
Qty: 45 CAPSULE | Refills: 2 | Status: SHIPPED | OUTPATIENT
Start: 2025-07-30

## 2025-08-19 ENCOUNTER — OFFICE VISIT (OUTPATIENT)
Age: 75
End: 2025-08-19
Payer: MEDICARE

## 2025-08-19 VITALS
HEIGHT: 65 IN | OXYGEN SATURATION: 99 % | BODY MASS INDEX: 27.49 KG/M2 | HEART RATE: 58 BPM | SYSTOLIC BLOOD PRESSURE: 120 MMHG | WEIGHT: 165 LBS | DIASTOLIC BLOOD PRESSURE: 70 MMHG

## 2025-08-19 DIAGNOSIS — R55 SYNCOPE, UNSPECIFIED SYNCOPE TYPE: Primary | ICD-10-CM

## 2025-08-19 PROCEDURE — 1090F PRES/ABSN URINE INCON ASSESS: CPT

## 2025-08-19 PROCEDURE — 3017F COLORECTAL CA SCREEN DOC REV: CPT

## 2025-08-19 PROCEDURE — 1159F MED LIST DOCD IN RCRD: CPT

## 2025-08-19 PROCEDURE — 1123F ACP DISCUSS/DSCN MKR DOCD: CPT

## 2025-08-19 PROCEDURE — 99214 OFFICE O/P EST MOD 30 MIN: CPT

## 2025-08-19 PROCEDURE — G8399 PT W/DXA RESULTS DOCUMENT: HCPCS

## 2025-08-19 PROCEDURE — G8427 DOCREV CUR MEDS BY ELIG CLIN: HCPCS

## 2025-08-19 PROCEDURE — G8419 CALC BMI OUT NRM PARAM NOF/U: HCPCS

## 2025-08-19 PROCEDURE — 1036F TOBACCO NON-USER: CPT

## (undated) DEVICE — 3M™ CUROS™ DISINFECTING CAP FOR NEEDLELESS CONNECTORS 270/CARTON 20 CARTONS/CASE CFF1-270: Brand: CUROS™

## (undated) DEVICE — BAG SPEC BIOHZRD 10 X 10 IN --

## (undated) DEVICE — KENDALL RADIOLUCENT FOAM MONITORING ELECTRODE -RECTANGULAR SHAPE: Brand: KENDALL

## (undated) DEVICE — 1200 GUARD II KIT W/5MM TUBE W/O VAC TUBE: Brand: GUARDIAN

## (undated) DEVICE — CONTAINER SPEC 20 ML LID NEUT BUFF FORMALIN 10 % POLYPR STS

## (undated) DEVICE — Device

## (undated) DEVICE — ADULT SPO2 SENSOR: Brand: NELLCOR

## (undated) DEVICE — CUFF RMFG BP INF SZ 11 DISP -- LAWSON OEM ITEM 238915

## (undated) DEVICE — SNARE ENDOSCP M L240CM W27MM SHTH DIA2.4MM CHN 2.8MM OVL

## (undated) DEVICE — BAG BELONG PT PERS CLEAR HANDL

## (undated) DEVICE — CATH IV AUTOGRD BC PNK 20GA 25 -- INSYTE

## (undated) DEVICE — TRAP SUC MUCOUS 70ML -- MEDICHOICE MEDLINE

## (undated) DEVICE — KIT COLON W/ 1.1OZ LUB AND 2 END

## (undated) DEVICE — SOLIDIFIER MEDC 1200ML -- CONVERT TO 356117